# Patient Record
Sex: FEMALE | Race: WHITE | NOT HISPANIC OR LATINO | Employment: OTHER | URBAN - METROPOLITAN AREA
[De-identification: names, ages, dates, MRNs, and addresses within clinical notes are randomized per-mention and may not be internally consistent; named-entity substitution may affect disease eponyms.]

---

## 2019-02-06 ENCOUNTER — TELEPHONE (OUTPATIENT)
Dept: CARDIOLOGY | Facility: CLINIC | Age: 69
End: 2019-02-06

## 2019-02-06 NOTE — TELEPHONE ENCOUNTER
NP OV -- 3/1/19 -- Zully Fernandes    Pt was last seen 1/2016 for ABN EKG, palpitations, CP.    I called for reason for visit- She states she has had bronchitis x 3mos- Has been on prednisone 5 times and 3 different antibotics. Had 2 CXR- clear.    She had seen Dr. Escobar ([facility]) @ [place]- had Ct Chest- was told it showed CAD.    Family History-    Mother- HTN; DM  Father- HTN  Brother- Hyperlipidemia- on statins  Sister- Hyperlipidemia- on statins.    She states she does not want to be on statins.    Current PCP is Jennifer (2019)    Previous PCP is Thiago    I faxed Jennifer Escobar and Thiago for records.

## 2019-03-01 ENCOUNTER — TELEPHONE (OUTPATIENT)
Dept: CARDIOLOGY | Facility: CLINIC | Age: 69
End: 2019-03-01

## 2019-03-01 ENCOUNTER — OFFICE VISIT (OUTPATIENT)
Dept: CARDIOLOGY | Facility: CLINIC | Age: 69
End: 2019-03-01
Payer: COMMERCIAL

## 2019-03-01 VITALS
RESPIRATION RATE: 16 BRPM | SYSTOLIC BLOOD PRESSURE: 122 MMHG | HEART RATE: 74 BPM | WEIGHT: 137.1 LBS | OXYGEN SATURATION: 97 % | HEIGHT: 63 IN | BODY MASS INDEX: 24.29 KG/M2 | DIASTOLIC BLOOD PRESSURE: 66 MMHG

## 2019-03-01 DIAGNOSIS — R00.2 PALPITATIONS: ICD-10-CM

## 2019-03-01 DIAGNOSIS — R06.02 SHORTNESS OF BREATH: ICD-10-CM

## 2019-03-01 DIAGNOSIS — R07.89 SENSATION OF CHEST TIGHTNESS: ICD-10-CM

## 2019-03-01 DIAGNOSIS — I25.10 CORONARY ARTERY CALCIFICATION SEEN ON CAT SCAN: ICD-10-CM

## 2019-03-01 DIAGNOSIS — Z00.00 ROUTINE ADULT HEALTH MAINTENANCE: Primary | ICD-10-CM

## 2019-03-01 PROCEDURE — 99204 OFFICE O/P NEW MOD 45 MIN: CPT | Performed by: INTERNAL MEDICINE

## 2019-03-01 PROCEDURE — 93000 ELECTROCARDIOGRAM COMPLETE: CPT | Performed by: INTERNAL MEDICINE

## 2019-03-01 RX ORDER — PHENOBARBITAL 60 MG/1
60 TABLET ORAL DAILY
Refills: 0 | COMMUNITY
Start: 2019-02-08 | End: 2023-10-31

## 2019-03-01 RX ORDER — PANTOPRAZOLE SODIUM 40 MG/1
40 TABLET, DELAYED RELEASE ORAL DAILY
Refills: 0 | COMMUNITY
Start: 2019-02-18 | End: 2024-11-01

## 2019-03-01 RX ORDER — FAMOTIDINE 40 MG/1
40 TABLET, FILM COATED ORAL NIGHTLY
Refills: 0 | COMMUNITY
Start: 2019-02-18 | End: 2024-11-01

## 2019-03-01 ASSESSMENT — ENCOUNTER SYMPTOMS
NEAR-SYNCOPE: 0
DYSPNEA ON EXERTION: 1
NERVOUS/ANXIOUS: 1
COUGH: 1
WEIGHT GAIN: 1
SYNCOPE: 0
PALPITATIONS: 0
PND: 0
SPUTUM PRODUCTION: 1
TREMORS: 1
SLEEP DISTURBANCES DUE TO BREATHING: 1
ORTHOPNEA: 0
SHORTNESS OF BREATH: 1
WHEEZING: 1

## 2019-03-01 ASSESSMENT — PAIN SCALES - GENERAL: PAINLEVEL: 0-NO PAIN

## 2019-03-01 NOTE — PROGRESS NOTES
"Cardiology Consult/New Patient  Patient ID: Trudi Patel is a 68 y.o. female. 1950  HPI   The patient presents to the office today for evaluation regarding an abnormal chest CT.  Please allow me to review her history.  She is a 68-year-old white female whose past medical history is significant for reflux, irritable bowel and seizure disorder, who was seen by me several years ago for intermittent palpitations.  At that time, she had a stress echo which was negative for ischemia.  She did not have any personal family history and although both of her parents had heart disease, her mother lived to age 92 and her father also  at 92 after having a myocardial infarction at age 87.    She is in the office today because she had a chest CT done on 19 which showed mild biapical pleural changes, slightly more prominent on the right.  A 3 mm left apical parenchymal nodule and a few small mediastinal lymph nodes.  There was a \"small focus of coronary artery calcification\" amount not quantified.  She wanted to follow-up regarding this.    With her CAT scan was performed because she has been sick for approximately 2 months.  She was in her usual state of health when she traveled to visit family and was around small children.  She was also visited by her daughter who with a sinus infection and shortly after that she developed a sinus infection.  I reviewed office notes from various visits dating back to early January.  Her sinus infection quickly turned into a bronchitis and she says that she coughed so violently that she broke several ribs.  There is evidence of rib fractures on her CAT scan.  She was treated with antibiotics but because of the bilateral persistent coughing, she was placed on a Medrol Dosepak.  She states that this actually made her feel better, but it was short-lived.  As soon as she was off, her coughing started again.  She is using a Neti pot and says that she feels better \"with steam\".  She was " "also seen by ENT who felt she had \"silent reflux\" put her on an extremely restrictive diet with no dairy, no chocolate, no caffeine, etc. and she is very unhappy about this.  She states that she does not think it is her reflux and she is very unhappy about needing to follow the diet.  She has been multiple times if I thought this was necessary.    She says that she continues to cough, especially at nighttime and will cough for several hours continuously.  Phenergan with codeine helped.  I did not see any inhalers on her medication list and she says that she was given albuterol after her visit to urgent care, which she uses as needed and was previously prescribed \"a green inhaler\" and Symbicort.  She has not been using these because she \"does not want to become dependent upon them\".  She has been told she has asthma but she is not convinced this is true.  All through the office visit she was coughing and expectorating sputum.       Past Medical History:   Diagnosis Date   • Asthma    • Benign essential tremor    • Bronchitis     Persistent bronchitis following sinusitis   • GERD (gastroesophageal reflux disease)    • Irritable bowel syndrome    • Nephrolithiasis    • Osteoporosis     Patient has declined treatment for this   • Seizures (CMS/HCC) (HCC)    • Sinusitis      Past Surgical History:   Procedure Laterality Date   • APPENDECTOMY     • CHOLECYSTECTOMY     • ROTATOR CUFF REPAIR     • SEPTOPLASTY     • SHOULDER ARTHROSCOPY     • TUBAL LIGATION     • WRIST SURGERY Left      Social History   Substance Use Topics   • Smoking status: Never Smoker   • Smokeless tobacco: Never Used   • Alcohol use Yes      Comment: Social Drinker     Family History   Problem Relation Age of Onset   • Heart attack Mother    • Heart failure Mother    • Fainting Mother    • Diabetes Mother    • Hypertension Mother    • Hypertension Father    • Heart disease Father    • Hypertension Sister    • Hyperlipidemia Sister    • Diabetes Sister  " "  • Hyperlipidemia Brother         Allergies: Patient has no known allergies.    Current Medications:   Current Outpatient Prescriptions:   •  famotidine (PEPCID) 40 mg tablet, Take 40 mg by mouth nightly., Disp: , Rfl: 0  •  pantoprazole (PROTONIX) 40 mg EC tablet, 40 mg daily., Disp: , Rfl: 0  •  PHENobarbital (LUMINAL) 60 mg tablet, 60 mg daily., Disp: , Rfl: 0     Review of Systems   Constitution: Positive for weight gain.        Patient is unhappy about her recent weight gain-states she has been eating more and unable to exercise.   HENT: Positive for congestion.    Cardiovascular: Positive for dyspnea on exertion. Negative for chest pain, leg swelling, near-syncope, orthopnea, palpitations, paroxysmal nocturnal dyspnea and syncope.   Respiratory: Positive for cough, shortness of breath, sleep disturbances due to breathing, sputum production and wheezing.         Coughing almost constantly in the office.  The symptoms did not improve with sleeping in an elevated position.   Gastrointestinal:        Long history of reflux.   Neurological: Positive for tremors.   Psychiatric/Behavioral: The patient is nervous/anxious.         Very anxious about her persistent cough     Vitals:    03/01/19 1006 03/01/19 1035   BP: 122/70 122/66   BP Location: Left upper arm Right upper arm   Patient Position: Sitting    Pulse: 74    Resp: 16    SpO2: 97%    Weight: 62.2 kg (137 lb 1.6 oz)    Height: 1.6 m (5' 3\")      Physical Exam   Constitutional: She is oriented to person, place, and time. She appears well-developed and well-nourished. She is cooperative.   HENT:   Head: Normocephalic and atraumatic.   Mouth/Throat: Oropharynx is clear and moist and mucous membranes are normal.   Eyes: Conjunctivae and EOM are normal. No scleral icterus.   Neck: Trachea normal and normal range of motion. Neck supple. No hepatojugular reflux and no JVD present. Carotid bruit is not present. No thyromegaly present.   Cardiovascular: Normal rate, " "regular rhythm, S1 normal, S2 normal, normal heart sounds and intact distal pulses.   No extrasystoles are present. Exam reveals no gallop, no S3, no S4 and no friction rub.    Pulses:       Carotid pulses are 2+ on the right side, and 2+ on the left side.       Posterior tibial pulses are 2+ on the right side, and 2+ on the left side.   Pulmonary/Chest: Effort normal. She has wheezes in the right middle field and the right lower field. She has no rhonchi. She has no rales.   Almost incessant coughing.   Abdominal: Soft. Bowel sounds are normal. She exhibits no abdominal bruit. There is no tenderness.   Musculoskeletal: Normal range of motion. She exhibits no tenderness or deformity.   Neurological: She is alert and oriented to person, place, and time. No cranial nerve deficit.   Skin: Skin is warm, dry and intact. No rash noted. No cyanosis. Nails show no clubbing.   Psychiatric: Her speech is normal and behavior is normal. Her mood appears anxious.        Labs     Imaging:  Exercise stress echocardiogram 1/22/16: Normal LV size and function.  Normal wall motion.  RV normal size and function.  No pericardial effusion.  Trileaflet aortic valve with minimal sclerosis.  Minimal degenerative mitral valve disease.  Trace AI.  Trace TR.  Trace MR.  Trace PI.  Impaired relaxation.  Immediately following exercise all segments become hyperdynamic.    EKG:     Assessment/Plan   1.  Shortness of breath  Shortness of breath is secondary to persistent bronchitis with bronchospasm and cough.  She has no evidence of volume overload.  The majority of her questions were related to pulmonary issues and she wants to know if there is \"anything else she can do\".  She has had several suggestions from her pulmonologist.  I told her the first issue is that she is not being compliant with the prescribed inhalers and needs to start these as recommended.  The issue of \"becoming dependent on them\" is not an appropriate strategy right now.  " "She wants to know if she may need more prednisone and I told her this is possible.  I told her she should direct her questions to her pulmonologist for specific instructions.    2. Coronary artery calcification seen on CAT scan  Certainly, she needs to have an evaluation.  Given her current respiratory status, I do not think she will be able to have a stress echo for 6-8 weeks.  She has a very tiny amount of calcium that was not completely quantified.  She should eventually have a CT calcium score for formal quantification.  I told her that she may need to consider using aspirin, but I am not starting this right now considering her current asthma issues.  She is also about to go for CT of the sinuses.  She wanted to know if she needs statin and I told her this is a possibility, but I do not have any recent lipid profiles or LFTs.  She does not appear particularly enthusiastic about this.  - Echocardiogram stress test; Future    3. Palpitations  Her palpitations are not an issue right now.    4. Sensation of chest tightness  Prior to the bronchitis she was not experiencing any chest discomfort.  She will be returning for an exercise stress echocardiogram.    5.  Reflux  The patient is very unhappy about the reflux diet she was given.  She said \"I have a craving for chocolate and have some in the car\"-I told her this may worsen her reflux and cough.  Told her she should avoid dairy.  She wants to use caffeine, which I do not objective from a cardiac perspective, but she has been instructed not to do this by ENT.  I told her whether or not she follows these instructions as her decision.  Continue PPI.    Return in about 4 months (around 7/1/2019) for next scheduled follow-up, earlier with any change in symptoms, after appropriate studies have been completed.  Thank you for allowing me to participate in the care of this patient.  I hope this information is helpful.  Please do not hesitate to contact me with any " questions or concerns.      Shabana Willingham MD  3/1/2019

## 2019-03-01 NOTE — LETTER
"2019     Jenny Rodriguez MD  [address]  [facility] [address]    Patient: Trudi Patel   YOB: 1950   Date of Visit: 3/1/2019       Dear Dr. Rodriguez:    Thank you for referring Trudi Patel to me for evaluation. Below are my notes for this consultation.    If you have questions, please do not hesitate to call me. I look forward to following your patient along with you.         Sincerely,        Shabana Willingham MD        CC: MD Yaya Schaefer Erin, MD  3/1/2019  1:58 PM  Signed  Cardiology Consult/New Patient  Patient ID: Trudi Patel is a 68 y.o. female. 1950  HPI   The patient presents to the office today for evaluation regarding an abnormal chest CT.  Please allow me to review her history.  She is a 68-year-old white female whose past medical history is significant for reflux, irritable bowel and seizure disorder, who was seen by me several years ago for intermittent palpitations.  At that time, she had a stress echo which was negative for ischemia.  She did not have any personal family history and although both of her parents had heart disease, her mother lived to age 92 and her father also  at 92 after having a myocardial infarction at age 87.    She is in the office today because she had a chest CT done on 19 which showed mild biapical pleural changes, slightly more prominent on the right.  A 3 mm left apical parenchymal nodule and a few small mediastinal lymph nodes.  There was a \"small focus of coronary artery calcification\" amount not quantified.  She wanted to follow-up regarding this.    With her CAT scan was performed because she has been sick for approximately 2 months.  She was in her usual state of health when she traveled to visit family and was around small children.  She was also visited by her daughter who with a sinus infection and shortly after that she developed a sinus infection.  I reviewed office notes " "from various visits dating back to early January.  Her sinus infection quickly turned into a bronchitis and she says that she coughed so violently that she broke several ribs.  There is evidence of rib fractures on her CAT scan.  She was treated with antibiotics but because of the bilateral persistent coughing, she was placed on a Medrol Dosepak.  She states that this actually made her feel better, but it was short-lived.  As soon as she was off, her coughing started again.  She is using a Neti pot and says that she feels better \"with steam\".  She was also seen by ENT who felt she had \"silent reflux\" put her on an extremely restrictive diet with no dairy, no chocolate, no caffeine, etc. and she is very unhappy about this.  She states that she does not think it is her reflux and she is very unhappy about needing to follow the diet.  She has been multiple times if I thought this was necessary.    She says that she continues to cough, especially at nighttime and will cough for several hours continuously.  Phenergan with codeine helped.  I did not see any inhalers on her medication list and she says that she was given albuterol after her visit to urgent care, which she uses as needed and was previously prescribed \"a green inhaler\" and Symbicort.  She has not been using these because she \"does not want to become dependent upon them\".  She has been told she has asthma but she is not convinced this is true.  All through the office visit she was coughing and expectorating sputum.       Past Medical History:   Diagnosis Date   • Asthma    • Benign essential tremor    • Bronchitis     Persistent bronchitis following sinusitis   • GERD (gastroesophageal reflux disease)    • Irritable bowel syndrome    • Nephrolithiasis    • Osteoporosis     Patient has declined treatment for this   • Seizures (CMS/HCC) ([facility])    • Sinusitis      Past Surgical History:   Procedure Laterality Date   • APPENDECTOMY     • CHOLECYSTECTOMY     • " ROTATOR CUFF REPAIR     • SEPTOPLASTY     • SHOULDER ARTHROSCOPY     • TUBAL LIGATION     • WRIST SURGERY Left      Social History   Substance Use Topics   • Smoking status: Never Smoker   • Smokeless tobacco: Never Used   • Alcohol use Yes      Comment: Social Drinker     Family History   Problem Relation Age of Onset   • Heart attack Mother    • Heart failure Mother    • Fainting Mother    • Diabetes Mother    • Hypertension Mother    • Hypertension Father    • Heart disease Father    • Hypertension Sister    • Hyperlipidemia Sister    • Diabetes Sister    • Hyperlipidemia Brother         Allergies: Patient has no known allergies.    Current Medications:   Current Outpatient Prescriptions:   •  famotidine (PEPCID) 40 mg tablet, Take 40 mg by mouth nightly., Disp: , Rfl: 0  •  pantoprazole (PROTONIX) 40 mg EC tablet, 40 mg daily., Disp: , Rfl: 0  •  PHENobarbital (LUMINAL) 60 mg tablet, 60 mg daily., Disp: , Rfl: 0     Review of Systems   Constitution: Positive for weight gain.        Patient is unhappy about her recent weight gain-states she has been eating more and unable to exercise.   HENT: Positive for congestion.    Cardiovascular: Positive for dyspnea on exertion. Negative for chest pain, leg swelling, near-syncope, orthopnea, palpitations, paroxysmal nocturnal dyspnea and syncope.   Respiratory: Positive for cough, shortness of breath, sleep disturbances due to breathing, sputum production and wheezing.         Coughing almost constantly in the office.  The symptoms did not improve with sleeping in an elevated position.   Gastrointestinal:        Long history of reflux.   Neurological: Positive for tremors.   Psychiatric/Behavioral: The patient is nervous/anxious.         Very anxious about her persistent cough     Vitals:    03/01/19 1006 03/01/19 1035   BP: 122/70 122/66   BP Location: Left upper arm Right upper arm   Patient Position: Sitting    Pulse: 74    Resp: 16    SpO2: 97%    Weight: 62.2 kg (137  "lb 1.6 oz)    Height: 1.6 m (5' 3\")      Physical Exam   Constitutional: She is oriented to person, place, and time. She appears well-developed and well-nourished. She is cooperative.   HENT:   Head: Normocephalic and atraumatic.   Mouth/Throat: Oropharynx is clear and moist and mucous membranes are normal.   Eyes: Conjunctivae and EOM are normal. No scleral icterus.   Neck: Trachea normal and normal range of motion. Neck supple. No hepatojugular reflux and no JVD present. Carotid bruit is not present. No thyromegaly present.   Cardiovascular: Normal rate, regular rhythm, S1 normal, S2 normal, normal heart sounds and intact distal pulses.   No extrasystoles are present. Exam reveals no gallop, no S3, no S4 and no friction rub.    Pulses:       Carotid pulses are 2+ on the right side, and 2+ on the left side.       Posterior tibial pulses are 2+ on the right side, and 2+ on the left side.   Pulmonary/Chest: Effort normal. She has wheezes in the right middle field and the right lower field. She has no rhonchi. She has no rales.   Almost incessant coughing.   Abdominal: Soft. Bowel sounds are normal. She exhibits no abdominal bruit. There is no tenderness.   Musculoskeletal: Normal range of motion. She exhibits no tenderness or deformity.   Neurological: She is alert and oriented to person, place, and time. No cranial nerve deficit.   Skin: Skin is warm, dry and intact. No rash noted. No cyanosis. Nails show no clubbing.   Psychiatric: Her speech is normal and behavior is normal. Her mood appears anxious.        Labs     Imaging:  Exercise stress echocardiogram 1/22/16: Normal LV size and function.  Normal wall motion.  RV normal size and function.  No pericardial effusion.  Trileaflet aortic valve with minimal sclerosis.  Minimal degenerative mitral valve disease.  Trace AI.  Trace TR.  Trace MR.  Trace PI.  Impaired relaxation.  Immediately following exercise all segments become hyperdynamic.    EKG:   " "  Assessment/Plan   1.  Shortness of breath  Shortness of breath is secondary to persistent bronchitis with bronchospasm and cough.  She has no evidence of volume overload.  The majority of her questions were related to pulmonary issues and she wants to know if there is \"anything else she can do\".  She has had several suggestions from her pulmonologist.  I told her the first issue is that she is not being compliant with the prescribed inhalers and needs to start these as recommended.  The issue of \"becoming dependent on them\" is not an appropriate strategy right now.  She wants to know if she may need more prednisone and I told her this is possible.  I told her she should direct her questions to her pulmonologist for specific instructions.    2. Coronary artery calcification seen on CAT scan  Certainly, she needs to have an evaluation.  Given her current respiratory status, I do not think she will be able to have a stress echo for 6-8 weeks.  She has a very tiny amount of calcium that was not completely quantified.  She should eventually have a CT calcium score for formal quantification.  I told her that she may need to consider using aspirin, but I am not starting this right now considering her current asthma issues.  She is also about to go for CT of the sinuses.  She wanted to know if she needs statin and I told her this is a possibility, but I do not have any recent lipid profiles or LFTs.  She does not appear particularly enthusiastic about this.  - Echocardiogram stress test; Future    3. Palpitations  Her palpitations are not an issue right now.    4. Sensation of chest tightness  Prior to the bronchitis she was not experiencing any chest discomfort.  She will be returning for an exercise stress echocardiogram.    5.  Reflux  The patient is very unhappy about the reflux diet she was given.  She said \"I have a craving for chocolate and have some in the car\"-I told her this may worsen her reflux and cough.  " Told her she should avoid dairy.  She wants to use caffeine, which I do not objective from a cardiac perspective, but she has been instructed not to do this by ENT.  I told her whether or not she follows these instructions as her decision.  Continue PPI.    Return in about 4 months (around 7/1/2019) for next scheduled follow-up, earlier with any change in symptoms, after appropriate studies have been completed.  Thank you for allowing me to participate in the care of this patient.  I hope this information is helpful.  Please do not hesitate to contact me with any questions or concerns.      Shabana Willingham MD  3/1/2019

## 2019-04-03 ENCOUNTER — TELEPHONE (OUTPATIENT)
Dept: CARDIOLOGY | Facility: CLINIC | Age: 69
End: 2019-04-03

## 2019-04-03 NOTE — TELEPHONE ENCOUNTER
LM[facility] to [facility] pt[facility]19 [facility]

## 2019-06-29 NOTE — TELEPHONE ENCOUNTER
Pt rescheduled Stress Echo to 8/16/19.    The approved precert expires 7/27/19.    Please re-precert.    Stress echo- [facility]- 8/16/19

## 2019-08-16 ENCOUNTER — TELEPHONE (OUTPATIENT)
Dept: SCHEDULING | Facility: CLINIC | Age: 69
End: 2019-08-16

## 2019-08-16 ENCOUNTER — HOSPITAL ENCOUNTER (OUTPATIENT)
Dept: CARDIOLOGY | Facility: CLINIC | Age: 69
Discharge: HOME | End: 2019-08-16
Payer: COMMERCIAL

## 2019-08-16 VITALS — BODY MASS INDEX: 24.27 KG/M2 | WEIGHT: 137 LBS | HEIGHT: 63 IN

## 2019-08-16 DIAGNOSIS — R06.02 SHORTNESS OF BREATH: ICD-10-CM

## 2019-08-16 DIAGNOSIS — I25.10 CORONARY ARTERY CALCIFICATION SEEN ON CAT SCAN: ICD-10-CM

## 2019-08-16 PROCEDURE — 93350 STRESS TTE ONLY: CPT | Performed by: INTERNAL MEDICINE

## 2019-08-16 NOTE — TELEPHONE ENCOUNTER
Pt requesting to have today's Stress Echo Test results sent to Pulmonogist, Dr. Mitchell Haro at [facility]: Medical Records as soon as they become available.     [phone]  [phone]    Pt is also asking that results be sent to PCP, Dr. Villalpando     [phone]  [phone]

## 2019-08-20 LAB
AORTIC ROOT ANNULUS: 3.4 CM
ASCENDING AORTA: 3.2 CM
AV PEAK GRADIENT: 7 MMHG
AV PEAK VELOCITY-S: 1.32 M/S
AV VALVE AREA: 2.28 CM2
BSA FOR ECHO PROCEDURE: 1.66 M2
E WAVE DECELERATION TIME: 215 MS
E/A RATIO: 0.9
E/E' RATIO: 5.7
E/LAT E' RATIO: 5.7
EDV (BP): 64.2 CM3
EF (A4C): 68 %
EF A2C: 76.2 %
EJECTION FRACTION: 72.6 %
ESV (BP): 17.6 CM3
LA ESV (BP): 37.8 CM3
LA ESV INDEX (A2C): 25.3 CM3/M2
LA ESV INDEX (BP): 22.77 CM3/M2
LA/AORTA RATIO: 0.85
LAAS-AP2: 16.2 CM2
LAAS-AP4: 13.4 CM2
LAD 2D: 2.9 CM
LALD A4C: 4.5 CM
LALD A4C: 5.02 CM
LAV-S: 42 CM3
LEFT ATRIUM VOLUME INDEX: 22.89 CM3/M2
LEFT ATRIUM VOLUME: 38 CM3
LEFT VENTRICLE DIASTOLIC VOLUME INDEX: 41.75 CM3/M2
LEFT VENTRICLE DIASTOLIC VOLUME: 69.3 CM3
LEFT VENTRICLE SYSTOLIC VOLUME INDEX: 13.37 CM3/M2
LEFT VENTRICLE SYSTOLIC VOLUME: 22.2 CM3
LV DIASTOLIC VOLUME: 59.4 CM3
LV ESV (APICAL 2 CHAMBER): 14.2 CM3
LVAD-AP2: 21.8 CM2
LVAD-AP4: 24.4 CM2
LVAS-AP2: 9.08 CM2
LVAS-AP4: 11.5 CM2
LVEDVI(A2C): 35.78 CM3/M2
LVEDVI(BP): 38.67 CM3/M2
LVESVI(A2C): 8.55 CM3/M2
LVESVI(BP): 10.6 CM3/M2
LVLD-AP2: 6.98 CM
LVLD-AP4: 7.15 CM
LVLS-AP2: 5.39 CM
LVLS-AP4: 5.52 CM
LVOT 2D: 1.9 CM
LVOT A: 2.84 CM2
LVOT PEAK VELOCITY: 1.06 M/S
MV E'TISSUE VEL-LAT: 0.11 M/S
MV E'TISSUE VEL-MED: 0.11 M/S
MV PEAK A VEL: 0.76 M/S
MV PEAK E VEL: 0.65 M/S
PV PEAK GRADIENT: 4 MMHG
PV PV: 0.97 M/S
RVOT VMAX: 0.78 M/S
STRESS ANGINA INDEX: 0
STRESS BASELINE BP: NORMAL MMHG
STRESS BASELINE HR: 66 BPM
STRESS O2 SAT REST: 97 %
STRESS PERCENT HR: 88 %
STRESS POST ESTIMATED WORKLOAD: 7 METS
STRESS POST EXERCISE DUR MIN: 6 MIN
STRESS POST EXERCISE DUR SEC: 22 SEC
STRESS POST PEAK BP: NORMAL MMHG
STRESS POST PEAK HR: 134 BPM
STRESS TARGET HR: 130 BPM
TR MAX PG: 16 MMHG
TRICUSPID VALVE PEAK REGURGITATION VELOCITY: 2 M/S
TV REST PULMONARY ARTERY PRESSURE: 21 MMHG

## 2019-08-30 ENCOUNTER — OFFICE VISIT (OUTPATIENT)
Dept: CARDIOLOGY | Facility: CLINIC | Age: 69
End: 2019-08-30
Payer: COMMERCIAL

## 2019-08-30 VITALS
OXYGEN SATURATION: 98 % | SYSTOLIC BLOOD PRESSURE: 118 MMHG | BODY MASS INDEX: 23.57 KG/M2 | WEIGHT: 133 LBS | DIASTOLIC BLOOD PRESSURE: 84 MMHG | HEIGHT: 63 IN | HEART RATE: 65 BPM

## 2019-08-30 DIAGNOSIS — R06.02 SHORTNESS OF BREATH: Primary | ICD-10-CM

## 2019-08-30 DIAGNOSIS — R00.2 PALPITATION: ICD-10-CM

## 2019-08-30 DIAGNOSIS — I25.10 CORONARY ARTERY CALCIFICATION SEEN ON CAT SCAN: ICD-10-CM

## 2019-08-30 PROCEDURE — 99214 OFFICE O/P EST MOD 30 MIN: CPT | Performed by: INTERNAL MEDICINE

## 2019-08-30 PROCEDURE — 93000 ELECTROCARDIOGRAM COMPLETE: CPT | Performed by: INTERNAL MEDICINE

## 2019-08-30 RX ORDER — FLUTICASONE PROPIONATE 50 MCG
1 SPRAY, SUSPENSION (ML) NASAL DAILY
COMMUNITY
End: 2023-10-31

## 2019-08-30 RX ORDER — PHENOBARBITAL, HYOSCYAMINE SULFATE, ATROPINE SULFATE AND SCOPOLAMINE HYDROBROMIDE .0194; .1037; 16.2; .0065 MG/1; MG/1; MG/1; MG/1
1 TABLET ORAL AS NEEDED
COMMUNITY
End: 2023-10-31

## 2019-08-30 RX ORDER — TALC
3 POWDER (GRAM) TOPICAL NIGHTLY
COMMUNITY
End: 2024-11-01

## 2019-08-30 RX ORDER — MONTELUKAST SODIUM 10 MG/1
10 TABLET ORAL NIGHTLY
COMMUNITY
End: 2023-10-31

## 2019-08-30 RX ORDER — CETIRIZINE HYDROCHLORIDE 5 MG/1
5 TABLET ORAL DAILY
COMMUNITY
End: 2023-10-31

## 2019-08-30 RX ORDER — POLYETHYLENE GLYCOL 3350 17 G/17G
17 POWDER, FOR SOLUTION ORAL DAILY
COMMUNITY
End: 2023-10-31

## 2019-08-30 NOTE — LETTER
"September 4, 2019     Rashmi Olivares MD  [address]  [facility] [address]    Patient: Trudi Patel  YOB: 1950  Date of Visit: 8/30/2019      Dear Dr. Olivares:    Thank you for referring Trudi Patel to me for evaluation. Below are my notes for this consultation.    If you have questions, please do not hesitate to call me. I look forward to following your patient along with you.         Sincerely,        Shabana Willingham MD        CC: MD Yaya Schaefer Erin, MD  9/4/2019  9:30 AM  Signed       Cardiology Office Visit     Patient ID: Trudi Patel 69 y.o. female 1950    History Present Illness     Trudi Patel returns to the office today for follow-up regarding her dyspnea and persistent cough.  Please allow me to review her history.  The patient is a 69-year-old white female who was initially evaluated in March of this year regarding an abnormal chest CT.  She had no personal history of coronary artery disease, myocardial infarction or CHF.  Several years ago she had a stress echo for intermittent palpitations which was negative for ischemia.  Because she had developed incessant cough she had a chest CT done in late January demonstrating mild biapical pleural changes, slightly more prominent on the right.  There was a 3 mm left apical parenchymal nodule and a few small mediastinal lymph nodes.  The report also described a \"small focus of coronary artery calcification\" the amount was not quantified.  She was recommended to follow-up regarding this.      Today in the office she says she is feeling much better.  It took many months, but her cough has finally improved.  She is sleeping better, but is still not completely back at her baseline.  She is becoming more active again.    On 8/16/2019, the patient had an exercise stress echocardiogram.  She had an average exercise capacity with no exercise-induced arrhythmia.  At baseline the LV was " normal in size and function with normal wall motion.  LVEF 65-70%.  RV was normal in size and function.  The left and right atria were normal.  Trace TR.  PASP approximately 25 mmHg.  Trace MR.  Trileaflet aortic valve with no significant stenosis or insufficiency.  Immediately following exercise, all segments became hyperdynamic.  We reviewed these results today.    Past History     Past Medical History:   Diagnosis Date   • Asthma    • Benign essential tremor    • Bronchitis     Persistent bronchitis following sinusitis   • GERD (gastroesophageal reflux disease)    • Irritable bowel syndrome    • Nephrolithiasis    • Osteoporosis     Patient has declined treatment for this   • Seizures (CMS/HCC) (HCC)    • Sinusitis      Past Surgical History:   Procedure Laterality Date   • APPENDECTOMY     • CHOLECYSTECTOMY     • ROTATOR CUFF REPAIR     • SEPTOPLASTY     • SHOULDER ARTHROSCOPY     • TUBAL LIGATION     • WRIST SURGERY Left      Social History:  reports that she has never smoked. She has never used smokeless tobacco. She reports that she drinks alcohol. She reports that she does not use drugs.    Family History: family history includes Diabetes in her mother and sister; Fainting in her mother; Heart attack in her mother; Heart disease in her father; Heart failure in her mother; Hyperlipidemia in her brother and sister; Hypertension in her father, mother, and sister.  Allergies/Medications   Allergies:Patient has no known allergies.    Medications:  Outpatient Encounter Prescriptions as of 8/30/2019:   •  cetirizine (ZyrTEC) 5 mg tablet, Take 5 mg by mouth daily.  •  famotidine (PEPCID) 40 mg tablet, Take 40 mg by mouth nightly.  •  fluticasone propionate (FLONASE) 50 mcg/actuation nasal spray, Administer 1 spray into each nostril daily.  •  melatonin 3 mg tablet, Take 3 mg by mouth nightly.  •  montelukast (SINGULAIR) 10 mg tablet, Take 10 mg by mouth nightly.  •  pantoprazole (PROTONIX) 40 mg EC tablet, 40 mg  "daily.  •  PHENobarbital (LUMINAL) 60 mg tablet, 60 mg daily.  •  PHENobarbital-hyoscyaamine-atropine-scopoloamine (DONNATAL) 16.2-0.1037 -0.0194 mg tablet, Take 1 tablet by mouth as needed for heartburn.  •  polyethylene glycol (MIRALAX) 17 gram packet, Take 17 g by mouth daily.  •  rifAXIMin (XIFAXAN) 550 mg tablet, Take 550 mg by mouth 3 (three) times a day.  •  wheat dextrin (BENEFIBER CLEAR SF, DEXTRIN, ORAL), Take 1 tablet by mouth.  ROS/Physical Exam   ROS  Pertinent items are noted in HPI.  Cough has improved significantly.  Dyspnea has improved.  Increased stress regarding her 's medical issues.  Comprehensive (12+ point) ROS otherwise negative.  Vitals:    08/30/19 0909   BP: 118/84 millimeters mercury, repeat blood pressure 124/78 mmHg   BP Location: Left upper arm   Patient Position: Sitting   Pulse: 65   SpO2: 98%   Weight: 60.3 kg (133 lb)   Height: 1.6 m (5' 3\")     BP Readings from Last 3 Encounters:   08/30/19 118/84   03/01/19 122/66     Wt Readings from Last 3 Encounters:   08/30/19 60.3 kg (133 lb)   08/16/19 62.1 kg (137 lb)   03/01/19 62.2 kg (137 lb 1.6 oz)     Physical Exam   Constitutional: She is oriented to person, place, and time. She appears well-developed and well-nourished. She is cooperative.   HENT:   Head: Normocephalic and atraumatic.   Mouth/Throat: Oropharynx is clear and moist and mucous membranes are normal.   Eyes: Conjunctivae and EOM are normal. No scleral icterus.   Neck: Trachea normal and normal range of motion. Neck supple. No hepatojugular reflux and no JVD present. Carotid bruit is not present. No thyromegaly present.   Cardiovascular: Normal rate, regular rhythm, S1 normal, S2 normal, normal heart sounds and intact distal pulses.   No extrasystoles are present. Exam reveals no gallop, no S3, no S4 and no friction rub.    Pulses:       Carotid pulses are 2+ on the right side, and 2+ on the left side.       Posterior tibial pulses are 2+ on the right side, and 2+ " on the left side.   Pulmonary/Chest: Effort normal. She has no wheezes. She has no rhonchi. She has no rales.   Prolonged expiratory phase.  No active wheezing.   Abdominal: Soft. Bowel sounds are normal. She exhibits no abdominal bruit. There is no tenderness.   Musculoskeletal: Normal range of motion. She exhibits no tenderness or deformity.   Neurological: She is alert and oriented to person, place, and time. No cranial nerve deficit.   Skin: Skin is warm, dry and intact. No rash noted. No cyanosis. Nails show no clubbing.   Psychiatric: Her speech is normal and behavior is normal. Her mood appears anxious.     Labs/Imaging/Procedures   Labs  No recent lab work available    Imaging  Exercise stress echo 8/16/2019: Average exercise capacity with no exercise-induced arrhythmia.  Exercise EKG negative for ischemia.  At baseline, the LV was normal in size and function with normal wall motion.  LVEF 65-70%.  RV was normal in size and function.  The left and right atria were normal.  Trace TR.  PASP approximately 25 mmHg. Trace MR.  Trileaflet aortic valve with no significant stenosis or insufficiency.  Immediately following exercise, all segments became hyperdynamic.     Exercise stress echocardiogram 1/22/16: Normal LV size and function.  Normal wall motion.  RV normal size and function.  No pericardial effusion.  Trileaflet aortic valve with minimal sclerosis.  Minimal degenerative mitral valve disease.  Trace AI.  Trace TR.  Trace MR.  Trace PI.  Impaired relaxation.  Immediately following exercise all segments become hyperdynamic    EKG  Assessment/Plan     1. Palpitation  Occasional palpitations, but no sustained arrhythmia.  This is not much of an issue right now.  She has normal LV and RV function and does not require any additional treatment.  I would avoid beta-blocker given all of her pulmonary issues.    2. Coronary artery calcification seen on CAT scan  Small focus of coronary calcium, not quantified.  Her  stress echo was negative.  Continue aggressive risk factor modification.  She does not have any fasting lipid profile available, but when should be performed if she has nothing in the last year.    3. Shortness of breath  Her shortness of breath has improved.  The cough is significantly better.  She will continue on her current pulmonary regimen.  She continues on reflux medication as well.  Trudi has no evidence of congestive heart failure.  I am not making any changes in her current medical regimen.  She does wish to see an allergist and is in the process of trying to find someone who accepts her insurance.    Return in about 1 year (around 8/30/2020) for next scheduled follow-up, earlier with any change in symptoms.  The patient has requested that a copy of her letter and stress echo be mailed to her home.  I have reviewed the patient's medical history in detail and updated the computerized patient record.  Thank you for allowing me to participate in the care of this patient.  I hope this information is helpful.    Shabana Willingham MD [facility]  9/4/2019  9:10 AM

## 2019-09-04 ENCOUNTER — TELEPHONE (OUTPATIENT)
Dept: CARDIOLOGY | Facility: CLINIC | Age: 69
End: 2019-09-04

## 2019-09-04 PROBLEM — R06.02 SHORTNESS OF BREATH: Status: ACTIVE | Noted: 2019-09-04

## 2019-09-04 PROBLEM — I25.10 CORONARY ARTERY CALCIFICATION SEEN ON CAT SCAN: Chronic | Status: ACTIVE | Noted: 2019-03-01

## 2019-09-04 NOTE — PROGRESS NOTES
"     Cardiology Office Visit     Patient ID: Trudi Patel 69 y.o. female 1950    History Present Illness     Trudi Patel returns to the office today for follow-up regarding her dyspnea and persistent cough.  Please allow me to review her history.  The patient is a 69-year-old white female who was initially evaluated in March of this year regarding an abnormal chest CT.  She had no personal history of coronary artery disease, myocardial infarction or CHF.  Several years ago she had a stress echo for intermittent palpitations which was negative for ischemia.  Because she had developed incessant cough she had a chest CT done in late January demonstrating mild biapical pleural changes, slightly more prominent on the right.  There was a 3 mm left apical parenchymal nodule and a few small mediastinal lymph nodes.  The report also described a \"small focus of coronary artery calcification\" the amount was not quantified.  She was recommended to follow-up regarding this.      Today in the office she says she is feeling much better.  It took many months, but her cough has finally improved.  She is sleeping better, but is still not completely back at her baseline.  She is becoming more active again.    On 8/16/2019, the patient had an exercise stress echocardiogram.  She had an average exercise capacity with no exercise-induced arrhythmia.  At baseline the LV was normal in size and function with normal wall motion.  LVEF 65-70%.  RV was normal in size and function.  The left and right atria were normal.  Trace TR.  PASP approximately 25 mmHg.  Trace MR.  Trileaflet aortic valve with no significant stenosis or insufficiency.  Immediately following exercise, all segments became hyperdynamic.  We reviewed these results today.    Past History     Past Medical History:   Diagnosis Date   • Asthma    • Benign essential tremor    • Bronchitis     Persistent bronchitis following sinusitis   • GERD (gastroesophageal reflux " disease)    • Irritable bowel syndrome    • Nephrolithiasis    • Osteoporosis     Patient has declined treatment for this   • Seizures (CMS/HCC) (HCC)    • Sinusitis      Past Surgical History:   Procedure Laterality Date   • APPENDECTOMY     • CHOLECYSTECTOMY     • ROTATOR CUFF REPAIR     • SEPTOPLASTY     • SHOULDER ARTHROSCOPY     • TUBAL LIGATION     • WRIST SURGERY Left      Social History:  reports that she has never smoked. She has never used smokeless tobacco. She reports that she drinks alcohol. She reports that she does not use drugs.    Family History: family history includes Diabetes in her mother and sister; Fainting in her mother; Heart attack in her mother; Heart disease in her father; Heart failure in her mother; Hyperlipidemia in her brother and sister; Hypertension in her father, mother, and sister.  Allergies/Medications   Allergies:Patient has no known allergies.    Medications:  Outpatient Encounter Prescriptions as of 8/30/2019:   •  cetirizine (ZyrTEC) 5 mg tablet, Take 5 mg by mouth daily.  •  famotidine (PEPCID) 40 mg tablet, Take 40 mg by mouth nightly.  •  fluticasone propionate (FLONASE) 50 mcg/actuation nasal spray, Administer 1 spray into each nostril daily.  •  melatonin 3 mg tablet, Take 3 mg by mouth nightly.  •  montelukast (SINGULAIR) 10 mg tablet, Take 10 mg by mouth nightly.  •  pantoprazole (PROTONIX) 40 mg EC tablet, 40 mg daily.  •  PHENobarbital (LUMINAL) 60 mg tablet, 60 mg daily.  •  PHENobarbital-hyoscyaamine-atropine-scopoloamine (DONNATAL) 16.2-0.1037 -0.0194 mg tablet, Take 1 tablet by mouth as needed for heartburn.  •  polyethylene glycol (MIRALAX) 17 gram packet, Take 17 g by mouth daily.  •  rifAXIMin (XIFAXAN) 550 mg tablet, Take 550 mg by mouth 3 (three) times a day.  •  wheat dextrin (BENEFIBER CLEAR SF, DEXTRIN, ORAL), Take 1 tablet by mouth.  ROS/Physical Exam   ROS  Pertinent items are noted in HPI.  Cough has improved significantly.  Dyspnea has improved.   "Increased stress regarding her 's medical issues.  Comprehensive (12+ point) ROS otherwise negative.  Vitals:    08/30/19 0909   BP: 118/84 millimeters mercury, repeat blood pressure 124/78 mmHg   BP Location: Left upper arm   Patient Position: Sitting   Pulse: 65   SpO2: 98%   Weight: 60.3 kg (133 lb)   Height: 1.6 m (5' 3\")     BP Readings from Last 3 Encounters:   08/30/19 118/84   03/01/19 122/66     Wt Readings from Last 3 Encounters:   08/30/19 60.3 kg (133 lb)   08/16/19 62.1 kg (137 lb)   03/01/19 62.2 kg (137 lb 1.6 oz)     Physical Exam   Constitutional: She is oriented to person, place, and time. She appears well-developed and well-nourished. She is cooperative.   HENT:   Head: Normocephalic and atraumatic.   Mouth/Throat: Oropharynx is clear and moist and mucous membranes are normal.   Eyes: Conjunctivae and EOM are normal. No scleral icterus.   Neck: Trachea normal and normal range of motion. Neck supple. No hepatojugular reflux and no JVD present. Carotid bruit is not present. No thyromegaly present.   Cardiovascular: Normal rate, regular rhythm, S1 normal, S2 normal, normal heart sounds and intact distal pulses.   No extrasystoles are present. Exam reveals no gallop, no S3, no S4 and no friction rub.    Pulses:       Carotid pulses are 2+ on the right side, and 2+ on the left side.       Posterior tibial pulses are 2+ on the right side, and 2+ on the left side.   Pulmonary/Chest: Effort normal. She has no wheezes. She has no rhonchi. She has no rales.   Prolonged expiratory phase.  No active wheezing.   Abdominal: Soft. Bowel sounds are normal. She exhibits no abdominal bruit. There is no tenderness.   Musculoskeletal: Normal range of motion. She exhibits no tenderness or deformity.   Neurological: She is alert and oriented to person, place, and time. No cranial nerve deficit.   Skin: Skin is warm, dry and intact. No rash noted. No cyanosis. Nails show no clubbing.   Psychiatric: Her speech is " normal and behavior is normal. Her mood appears anxious.     Labs/Imaging/Procedures   Labs  No recent lab work available    Imaging  Exercise stress echo 8/16/2019: Average exercise capacity with no exercise-induced arrhythmia.  Exercise EKG negative for ischemia.  At baseline, the LV was normal in size and function with normal wall motion.  LVEF 65-70%.  RV was normal in size and function.  The left and right atria were normal.  Trace TR.  PASP approximately 25 mmHg. Trace MR.  Trileaflet aortic valve with no significant stenosis or insufficiency.  Immediately following exercise, all segments became hyperdynamic.     Exercise stress echocardiogram 1/22/16: Normal LV size and function.  Normal wall motion.  RV normal size and function.  No pericardial effusion.  Trileaflet aortic valve with minimal sclerosis.  Minimal degenerative mitral valve disease.  Trace AI.  Trace TR.  Trace MR.  Trace PI.  Impaired relaxation.  Immediately following exercise all segments become hyperdynamic    EKG  Assessment/Plan     1. Palpitation  Occasional palpitations, but no sustained arrhythmia.  This is not much of an issue right now.  She has normal LV and RV function and does not require any additional treatment.  I would avoid beta-blocker given all of her pulmonary issues.    2. Coronary artery calcification seen on CAT scan  Small focus of coronary calcium, not quantified.  Her stress echo was negative.  Continue aggressive risk factor modification.  She does not have any fasting lipid profile available, but when should be performed if she has nothing in the last year.    3. Shortness of breath  Her shortness of breath has improved.  The cough is significantly better.  She will continue on her current pulmonary regimen.  She continues on reflux medication as well.  Trudi has no evidence of congestive heart failure.  I am not making any changes in her current medical regimen.  She does wish to see an allergist and is in the  process of trying to find someone who accepts her insurance.    Return in about 1 year (around 8/30/2020) for next scheduled follow-up, earlier with any change in symptoms.  The patient has requested that a copy of her letter and stress echo be mailed to her home.  I have reviewed the patient's medical history in detail and updated the computerized patient record.  Thank you for allowing me to participate in the care of this patient.  I hope this information is helpful.    Shabana Willingham MD [facility]  9/4/2019  9:10 AM

## 2019-09-04 NOTE — TELEPHONE ENCOUNTER
----- Message from Shabana Willingham MD sent at 9/4/2019  9:29 AM EDT -----  Regarding: Stress echo report and last office note   This patient requested that we mail a copy of her recent office visit and stress echo to her home.  Thank you

## 2019-09-11 NOTE — TELEPHONE ENCOUNTER
With reference to the notes below regarding a precert/ prior auth for a stress echo, I spoke to [facility] today attempting to get a retro prior auth. The original request for a modification of the prior auth obtained was not acquired. [facility] will not backdate any prior auths for outpatient testing.    The stress echo study will need to be adjusted off.

## 2023-02-01 LAB — HBA1C MFR BLD HPLC: 5.4 %

## 2023-09-07 RX ORDER — FAMOTIDINE 10 MG
TABLET ORAL
COMMUNITY
Start: 2023-04-10

## 2023-09-07 RX ORDER — MELOXICAM 7.5 MG/1
1 TABLET ORAL
COMMUNITY

## 2023-09-07 RX ORDER — CETIRIZINE HYDROCHLORIDE 5 MG/1
5 TABLET ORAL DAILY
COMMUNITY

## 2023-09-07 RX ORDER — PHENOBARBITAL, HYOSCYAMINE SULFATE, ATROPINE SULFATE, SCOPOLAMINE HYDROBROMIDE 16.2; .1037; .0194; .0065 MG/1; MG/1; MG/1; MG/1
1 TABLET ORAL
COMMUNITY

## 2023-09-07 RX ORDER — OXYMETAZOLINE HYDROCHLORIDE 0.05 G/100ML
2 SPRAY NASAL 2 TIMES DAILY
COMMUNITY
Start: 2023-08-07

## 2023-09-07 RX ORDER — CHOLESTYRAMINE 4 G/9G
POWDER, FOR SUSPENSION ORAL
COMMUNITY

## 2023-09-07 RX ORDER — MONTELUKAST SODIUM 10 MG/1
10 TABLET ORAL DAILY
COMMUNITY
Start: 2023-06-06 | End: 2024-05-31

## 2023-09-07 RX ORDER — FLUTICASONE PROPIONATE 50 MCG
1 SPRAY, SUSPENSION (ML) NASAL DAILY
COMMUNITY
Start: 2023-08-07 | End: 2024-08-06

## 2023-09-07 RX ORDER — PHENOBARBITAL 60 MG/1
60 TABLET ORAL DAILY
COMMUNITY
Start: 2023-06-28 | End: 2023-12-25

## 2023-09-07 RX ORDER — LIDOCAINE AND PRILOCAINE 25; 25 MG/G; MG/G
CREAM TOPICAL
COMMUNITY

## 2023-09-07 RX ORDER — ONDANSETRON 4 MG/1
TABLET, ORALLY DISINTEGRATING ORAL
COMMUNITY

## 2023-09-07 RX ORDER — DICYCLOMINE HYDROCHLORIDE 10 MG/1
CAPSULE ORAL
COMMUNITY

## 2023-09-07 RX ORDER — FEXOFENADINE HCL 60 MG/1
60 TABLET, FILM COATED ORAL DAILY
COMMUNITY
Start: 2023-08-07

## 2023-09-07 RX ORDER — POLYETHYLENE GLYCOL 3350
17 POWDER (GRAM) MISCELLANEOUS DAILY
COMMUNITY

## 2023-09-07 RX ORDER — NITROFURANTOIN 25; 75 MG/1; MG/1
CAPSULE ORAL
COMMUNITY

## 2023-09-07 RX ORDER — PHENOL 1.4 %
1 AEROSOL, SPRAY (ML) MUCOUS MEMBRANE
COMMUNITY

## 2023-09-07 RX ORDER — PANTOPRAZOLE SODIUM 40 MG/10ML
INJECTION, POWDER, LYOPHILIZED, FOR SOLUTION INTRAVENOUS
COMMUNITY
Start: 2023-07-20

## 2023-09-08 ENCOUNTER — TELEPHONE (OUTPATIENT)
Age: 73
End: 2023-09-08

## 2023-09-08 ENCOUNTER — OFFICE VISIT (OUTPATIENT)
Age: 73
End: 2023-09-08
Payer: COMMERCIAL

## 2023-09-08 VITALS
BODY MASS INDEX: 22.84 KG/M2 | HEIGHT: 64 IN | WEIGHT: 133.8 LBS | DIASTOLIC BLOOD PRESSURE: 68 MMHG | SYSTOLIC BLOOD PRESSURE: 114 MMHG

## 2023-09-08 DIAGNOSIS — Z86.010 PERSONAL HISTORY OF COLONIC POLYPS: ICD-10-CM

## 2023-09-08 DIAGNOSIS — Z86.69 HISTORY OF SEIZURE DISORDER: ICD-10-CM

## 2023-09-08 DIAGNOSIS — Z12.11 COLON CANCER SCREENING: ICD-10-CM

## 2023-09-08 DIAGNOSIS — K21.9 GASTROESOPHAGEAL REFLUX DISEASE WITHOUT ESOPHAGITIS: ICD-10-CM

## 2023-09-08 DIAGNOSIS — K58.0 IRRITABLE BOWEL SYNDROME WITH DIARRHEA: Primary | ICD-10-CM

## 2023-09-08 PROBLEM — Z86.0100 PERSONAL HISTORY OF COLONIC POLYPS: Status: ACTIVE | Noted: 2023-09-08

## 2023-09-08 PROCEDURE — 99204 OFFICE O/P NEW MOD 45 MIN: CPT | Performed by: INTERNAL MEDICINE

## 2023-09-08 RX ORDER — FAMOTIDINE 40 MG/1
40 TABLET, FILM COATED ORAL 2 TIMES DAILY
COMMUNITY
Start: 2023-02-08 | End: 2024-02-08

## 2023-09-08 RX ORDER — ERGOCALCIFEROL 1.25 MG/1
CAPSULE ORAL
COMMUNITY

## 2023-09-08 RX ORDER — PANTOPRAZOLE SODIUM 40 MG/1
TABLET, DELAYED RELEASE ORAL
COMMUNITY

## 2023-09-08 NOTE — PROGRESS NOTES
[facility] - Outpatient Consultation  Arjun Brasher 72 y.o. female MRN: [id number]  Encounter: [id number]    ASSESSMENT AND PLAN:      1. Irritable bowel syndrome with diarrhea  --- Patient with a longstanding history of irritable bowel syndrome. Primarily patient had IBS D. Previous regimens included Donnatal, dicyclomine and a course of rifaximin had been given. Patient doing well on supplemental peppermint oil at this time. Not having so much issues with diarrhea predominant IBS as the peppermint seems to control this.  -Continue the same as needed. Patient actually on the constipated side at this time and has required Colace and MiraLAX  -Bowels have changed and that her regular routine since shelter and her 's illness have altered her schedule    2. Personal history of colonic polyps  -With longstanding history of colon polyps. Has been getting colonoscopies every 3 years. Is due around this time.  -We will schedule colonoscopy at our outpatient center in the near future    3. Gastroesophageal reflux disease without esophagitis  -Controlled on Protonix 40 mg daily and famotidine 40 mg at bedtime.  -Previous history of esophagitis but follow-up examination evidently improved. Will review records  -Probably does not require EGD at this time - no history of Odonnell's by her report     4. Colon cancer screening  --Increased risk by virtue of the fact that she had polyps in her father had colon cancer in his 46s  - Colonoscopy; Future    5. History of seizure disorder  --No seizures for 20 years. Continues on phenobarbital      Followup Appointment:   6 mo   ______________________________________________________________________    Chief Complaint   Patient presents with   • GI consult     Pt has experiencing GI issues since she was a child.  She would like to establish care with new GI specialist.       HPI:   William Faulkner is a 72y.o. year old female who presents for evaluation for transfer GI care. Her former gastroenterologist is with a group that does not accept her present insurance. Patient does report longstanding history of irritable bowel syndrome. Primarily patient would have problems with crampy lower abdominal pain and diarrhea. More recently she has had not so much in the way of diarrhea but constipation. She has been on multiple antispasmodics in the past and had received treatments for IBS including dicyclomine and Donnatal.  She also at 1 time out of course of rifaximin. Presently she has been taking peppermint supplements with good results and this controls her lower abdominal cramping and pain. Patient was hospitalized in March 2022 with ileitis as noted by CAT scan. She responded to supportive care and IV fluids. Patient did have elevated temperature at that time and leukocytosis. Patient felt as though she had "a severe IBS attack". Illness was characterized by nausea vomiting and protracted diarrhea nonbloody which subsequently resolved. Felt likely to be of an infectious etiology. Patient had an ED visit in the fall 2022 and at that time had a CAT scan which showed some thickening in the descending colon possibly consistent with colitis and increased stool burden. She was not hospitalized at that time. Patient more recently she has had some problems even with constipation and has had to take MiraLAX and Colace. Patient does report that she has a longstanding history of colon polyps. She gets colonoscopy every 3 years and has had polyps on each exam.  She reports that she would be due for examination at this time. She also informs me that she has a somewhat technically difficult" tortuous colon. Her last colonoscopy was 3 years ago. She does have a family history of colon cancer and that her father was diagnosed with colon cancer in his 46s. Patient also has a longstanding history of GERD.   She has had upper endoscopies in the past but does not have Odonnell's esophagus. Symptoms are under control on Protonix and famotidine in the evening. No swallowing difficulties. She does have intermittent problems with belching.       Historical Information   Past Medical History:   Diagnosis Date   • Colon polyp 1988   • GERD (gastroesophageal reflux disease) 2004   • Irritable bowel syndrome 1959     Past Surgical History:   Procedure Laterality Date   • APPENDECTOMY  1959   • CHOLECYSTECTOMY  1985   • COLONOSCOPY  2020   • TUBAL LIGATION  1988   • UPPER GASTROINTESTINAL ENDOSCOPY  2020     Social History     Substance and Sexual Activity   Alcohol Use Yes   • Alcohol/week: 1.0 standard drink of alcohol   • Types: 1 Glasses of wine per week     Social History     Substance and Sexual Activity   Drug Use Never     Social History     Tobacco Use   Smoking Status Never   Smokeless Tobacco Never     Family History   Problem Relation Age of Onset   • Depression Mother    • Heart disease Mother    • Hyperlipidemia Mother    • Hypertension Mother    • Irritable bowel syndrome Mother    • Arthritis Father    • Cancer Father    • Colon polyps Father    • Heart disease Father    • Hyperlipidemia Father    • Hypertension Father    • Hyperlipidemia Sister    • Hypertension Sister    • Miscarriages / Stillbirths Sister    • Hyperlipidemia Brother        Meds/Allergies     Current Outpatient Medications:   •  ascorbic acid (VITAMIN C) 1000 MG tablet  •  cetirizine (ZyrTEC) 5 MG tablet  •  CHOLECALCIFEROL PO  •  denosumab (PROLIA) 60 mg/mL  •  ergocalciferol (ERGOCALCIFEROL) 1.25 MG (70933 UT) capsule  •  famotidine (PEPCID) 40 MG tablet  •  Melatonin 10 MG TABS  •  montelukast (SINGULAIR) 10 mg tablet  •  ondansetron (ZOFRAN-ODT) 4 mg disintegrating tablet  •  pantoprazole (PROTONIX) 40 mg tablet  •  PHENobarbital 60 mg tablet  •  Polyethylene Glycol 3350 POWD  •  atropine-phenobarbital-scopolamine-hyoscyamine (DONNATAL) 16.2 MG TABS  • cholestyramine (QUESTRAN) 4 g packet  •  dicyclomine (BENTYL) 10 mg capsule  •  famotidine (PEPCID) 10 mg tablet  •  fexofenadine (ALLEGRA) 60 MG tablet  •  fluticasone (FLONASE) 50 mcg/act nasal spray  •  lidocaine-prilocaine (EMLA) cream  •  meloxicam (MOBIC) 7.5 mg tablet  •  nitrofurantoin (MACROBID) 100 mg capsule  •  Oxymetazoline HCl (Nasal Spray) 0.05 % SOLN  •  pantoprazole (PROTONIX) 40 mg injection  •  rifaximin (XIFAXAN) 550 mg tablet  •  Wheat Dextrin (BENEFIBER PO)    Allergies   Allergen Reactions   • Pollen Extract Allergic Rhinitis       PHYSICAL EXAM:    Blood pressure 114/68, height 5' 3.5" (1.613 m), weight 60.7 kg (133 lb 12.8 oz). Body mass index is 23.33 kg/m². General Appearance: NAD, cooperative, alert  Eyes: Anicteric, conjunctiva pink  ENT:  Normocephalic, atraumatic, normal mucosa. Neck:  Supple, symmetrical, trachea midline,   Resp:  Clear to auscultation bilaterally; no rales, rhonchi or wheezing; respirations unlabored   CV:  S1 S2, Regular rate and rhythm; no murmur, rub, or gallop. GI:  Soft, mild diffuse tenderness, non-distended; normal bowel sounds; no masses, no organomegaly   Rectal: Deferred  Musculoskeletal: No cyanosis, clubbing or edema. Normal ROM. Skin:  No jaundice, rashes, or lesions   Heme/Lymph: No palpable cervical lymphadenopathy  Psych: Normal affect, good eye contact  Neuro: No gross deficits, AAOx3          REVIEW OF SYSTEMS:    CONSTITUTIONAL: Denies any fever, chills, rigors, weight gain- mild . HEENT: No earache or tinnitus. Decreased hearing . -- PND  CARDIOVASCULAR: No chest pain or palpitations. RESPIRATORY: Denies any cough, hemoptysis, shortness of breath or dyspnea on exertion. GASTROINTESTINAL: As noted in the History of Present Illness. GENITOURINARY: No problems with urination. Denies any hematuria or dysuria. NEUROLOGIC: No dizziness or vertigo, denies headaches.    MUSCULOSKELETAL:Positive for knee pain   SKIN: Denies skin rashes or itching. ENDOCRINE: Denies excessive thirst. Denies intolerance to heat or cold. PSYCHOSOCIAL: Denies depression or anxiety. Denies any recent memory loss.

## 2023-09-08 NOTE — LETTER
September 8, 2023     Nicolasa Reynolds, [address]    Patient: Jim Ramos   YOB: 1950   Date of Visit: 9/8/2023       Dear Dr. Heather Ennis:    Thank you for referring Jim Ramos to me for evaluation. Below are my notes for this consultation. If you have questions, please do not hesitate to call me. I look forward to following your patient along with you. Sincerely,        Rochelle Moore MD        CC: No Recipients    Rochelle Moore MD  9/8/2023  6:44 PM  Sign when Signing Visit      [facility] - Outpatient Consultation  Jim Ramos 72 y.o. female MRN: [id number]  Encounter: [id number]    ASSESSMENT AND PLAN:      1. Irritable bowel syndrome with diarrhea  --- Patient with a longstanding history of irritable bowel syndrome. Primarily patient had IBS D. Previous regimens included Donnatal, dicyclomine and a course of rifaximin had been given. Patient doing well on supplemental peppermint oil at this time. Not having so much issues with diarrhea predominant IBS as the peppermint seems to control this.  -Continue the same as needed. Patient actually on the constipated side at this time and has required Colace and MiraLAX  -Bowels have changed and that her regular routine since halfway and her 's illness have altered her schedule    2. Personal history of colonic polyps  -With longstanding history of colon polyps. Has been getting colonoscopies every 3 years. Is due around this time.  -We will schedule colonoscopy at our outpatient center in the near future    3. Gastroesophageal reflux disease without esophagitis  -Controlled on Protonix 40 mg daily and famotidine 40 mg at bedtime.  -Previous history of esophagitis but follow-up examination evidently improved. Will review records  -Probably does not require EGD at this time - no history of Odonnell's by her report     4.  Colon cancer screening  --Increased risk by virtue of the fact that she had polyps in her father had colon cancer in his 46s  - Colonoscopy; Future    5. History of seizure disorder  --No seizures for 20 years. Continues on phenobarbital      Followup Appointment:   6 mo   ______________________________________________________________________    Chief Complaint   Patient presents with   • GI consult     Pt has experiencing GI issues since she was a child. She would like to establish care with new GI specialist.       HPI:   Elvin Pastrana is a 72y.o. year old female who presents for evaluation for transfer GI care. Her former gastroenterologist is with a group that does not accept her present insurance. Patient does report longstanding history of irritable bowel syndrome. Primarily patient would have problems with crampy lower abdominal pain and diarrhea. More recently she has had not so much in the way of diarrhea but constipation. She has been on multiple antispasmodics in the past and had received treatments for IBS including dicyclomine and Donnatal.  She also at 1 time out of course of rifaximin. Presently she has been taking peppermint supplements with good results and this controls her lower abdominal cramping and pain. Patient was hospitalized in March 2022 with ileitis as noted by CAT scan. She responded to supportive care and IV fluids. Patient did have elevated temperature at that time and leukocytosis. Patient felt as though she had "a severe IBS attack". Illness was characterized by nausea vomiting and protracted diarrhea nonbloody which subsequently resolved. Felt likely to be of an infectious etiology. Patient had an ED visit in the fall 2022 and at that time had a CAT scan which showed some thickening in the descending colon possibly consistent with colitis and increased stool burden. She was not hospitalized at that time.   Patient more recently she has had some problems even with constipation and has had to take MiraLAX and Colace. Patient does report that she has a longstanding history of colon polyps. She gets colonoscopy every 3 years and has had polyps on each exam.  She reports that she would be due for examination at this time. She also informs me that she has a somewhat technically difficult" tortuous colon. Her last colonoscopy was 3 years ago. She does have a family history of colon cancer and that her father was diagnosed with colon cancer in his 46s. Patient also has a longstanding history of GERD. She has had upper endoscopies in the past but does not have Odonnell's esophagus. Symptoms are under control on Protonix and famotidine in the evening. No swallowing difficulties. She does have intermittent problems with belching.       Historical Information   Past Medical History:   Diagnosis Date   • Colon polyp 1988   • GERD (gastroesophageal reflux disease) 2004   • Irritable bowel syndrome 1959     Past Surgical History:   Procedure Laterality Date   • APPENDECTOMY  1959   • CHOLECYSTECTOMY  1985   • COLONOSCOPY  2020   • TUBAL LIGATION  1988   • UPPER GASTROINTESTINAL ENDOSCOPY  2020     Social History     Substance and Sexual Activity   Alcohol Use Yes   • Alcohol/week: 1.0 standard drink of alcohol   • Types: 1 Glasses of wine per week     Social History     Substance and Sexual Activity   Drug Use Never     Social History     Tobacco Use   Smoking Status Never   Smokeless Tobacco Never     Family History   Problem Relation Age of Onset   • Depression Mother    • Heart disease Mother    • Hyperlipidemia Mother    • Hypertension Mother    • Irritable bowel syndrome Mother    • Arthritis Father    • Cancer Father    • Colon polyps Father    • Heart disease Father    • Hyperlipidemia Father    • Hypertension Father    • Hyperlipidemia Sister    • Hypertension Sister    • Miscarriages / Stillbirths Sister    • Hyperlipidemia Brother        Meds/Allergies     Current Outpatient Medications:   •  ascorbic acid (VITAMIN C) 1000 MG tablet  •  cetirizine (ZyrTEC) 5 MG tablet  •  CHOLECALCIFEROL PO  •  denosumab (PROLIA) 60 mg/mL  •  ergocalciferol (ERGOCALCIFEROL) 1.25 MG (15923 UT) capsule  •  famotidine (PEPCID) 40 MG tablet  •  Melatonin 10 MG TABS  •  montelukast (SINGULAIR) 10 mg tablet  •  ondansetron (ZOFRAN-ODT) 4 mg disintegrating tablet  •  pantoprazole (PROTONIX) 40 mg tablet  •  PHENobarbital 60 mg tablet  •  Polyethylene Glycol 3350 POWD  •  atropine-phenobarbital-scopolamine-hyoscyamine (DONNATAL) 16.2 MG TABS  •  cholestyramine (QUESTRAN) 4 g packet  •  dicyclomine (BENTYL) 10 mg capsule  •  famotidine (PEPCID) 10 mg tablet  •  fexofenadine (ALLEGRA) 60 MG tablet  •  fluticasone (FLONASE) 50 mcg/act nasal spray  •  lidocaine-prilocaine (EMLA) cream  •  meloxicam (MOBIC) 7.5 mg tablet  •  nitrofurantoin (MACROBID) 100 mg capsule  •  Oxymetazoline HCl (Nasal Spray) 0.05 % SOLN  •  pantoprazole (PROTONIX) 40 mg injection  •  rifaximin (XIFAXAN) 550 mg tablet  •  Wheat Dextrin (BENEFIBER PO)    Allergies   Allergen Reactions   • Pollen Extract Allergic Rhinitis       PHYSICAL EXAM:    Blood pressure 114/68, height 5' 3.5" (1.613 m), weight 60.7 kg (133 lb 12.8 oz). Body mass index is 23.33 kg/m². General Appearance: NAD, cooperative, alert  Eyes: Anicteric, conjunctiva pink  ENT:  Normocephalic, atraumatic, normal mucosa. Neck:  Supple, symmetrical, trachea midline,   Resp:  Clear to auscultation bilaterally; no rales, rhonchi or wheezing; respirations unlabored   CV:  S1 S2, Regular rate and rhythm; no murmur, rub, or gallop. GI:  Soft, mild diffuse tenderness, non-distended; normal bowel sounds; no masses, no organomegaly   Rectal: Deferred  Musculoskeletal: No cyanosis, clubbing or edema. Normal ROM.   Skin:  No jaundice, rashes, or lesions   Heme/Lymph: No palpable cervical lymphadenopathy  Psych: Normal affect, good eye contact  Neuro: No gross deficits, AAOx3          REVIEW OF SYSTEMS:    CONSTITUTIONAL: Denies any fever, chills, rigors, weight gain- mild . HEENT: No earache or tinnitus. Decreased hearing . -- PND  CARDIOVASCULAR: No chest pain or palpitations. RESPIRATORY: Denies any cough, hemoptysis, shortness of breath or dyspnea on exertion. GASTROINTESTINAL: As noted in the History of Present Illness. GENITOURINARY: No problems with urination. Denies any hematuria or dysuria. NEUROLOGIC: No dizziness or vertigo, denies headaches. MUSCULOSKELETAL:Positive for knee pain   SKIN: Denies skin rashes or itching. ENDOCRINE: Denies excessive thirst. Denies intolerance to heat or cold. PSYCHOSOCIAL: Denies depression or anxiety. Denies any recent memory loss.

## 2023-09-08 NOTE — TELEPHONE ENCOUNTER
Scheduled date of colonoscopy (as of today):10/18/23  Physician performing colonoscopy:GRETCHEN  Location of colonoscopy:[facility]  Bowel prep reviewed with patient:Miralax/Dulcolax  Instructions reviewed with patient by:LEO  Clearances: N

## 2023-09-08 NOTE — LETTER
September 8, 2023     [facility], [address] [place] [address]    Patient: Mario Alberto Barksdale   YOB: 1950   Date of Visit: 9/8/2023       Dear Dr. Cardozo Friends:    Thank you for referring Mario Alberto Barksdale to me for evaluation. Below are my notes for this consultation. If you have questions, please do not hesitate to call me. I look forward to following your patient along with you. Sincerely,        Bhargav Salter MD        CC: No Recipients    Bhargav Salter MD  9/8/2023  6:43 PM  Incomplete      [facility] - Outpatient Consultation  Mario Alberto Barksdale 72 y.o. female MRN: [id number]  Encounter: [id number]    ASSESSMENT AND PLAN:      1. Irritable bowel syndrome with diarrhea  --- Patient with a longstanding history of irritable bowel syndrome. Primarily patient had IBS D. Previous regimens included Donnatal, dicyclomine and a course of rifaximin had been given. Patient doing well on supplemental peppermint oil at this time. Not having so much issues with diarrhea predominant IBS as the peppermint seems to control this.  -Continue the same as needed. Patient actually on the constipated side at this time and has required Colace and MiraLAX  -Bowels have changed and that her regular routine since [facility] and her 's illness have altered her schedule    2. Personal history of colonic polyps  -With longstanding history of colon polyps. Has been getting colonoscopies every 3 years. Is due around this time.  -We will schedule colonoscopy at our outpatient center in the near future    3. Gastroesophageal reflux disease without esophagitis  -Controlled on Protonix 40 mg daily and famotidine 40 mg at bedtime.  -Previous history of esophagitis but follow-up examination evidently improved. Will review records  -Probably does not require EGD at this time - no history of Odonnell's by her report     4.  Colon cancer screening  --Increased risk by virtue of the fact that she had polyps in her father had colon cancer in his 46s  - Colonoscopy; Future    5. History of seizure disorder  --No seizures for 20 years. Continues on phenobarbital      Followup Appointment:   6 mo   ______________________________________________________________________    Chief Complaint   Patient presents with   • GI consult     Pt has experiencing GI issues since she was a child. She would like to establish care with new GI specialist.       HPI:   Thelbert Heimlich is a 72y.o. year old female who presents for evaluation for transfer GI care. Her former gastroenterologist is with a group that does not accept her present insurance. Patient does report longstanding history of irritable bowel syndrome. Primarily patient would have problems with crampy lower abdominal pain and diarrhea. More recently she has had not so much in the way of diarrhea but constipation. She has been on multiple antispasmodics in the past and had received treatments for IBS including dicyclomine and Donnatal.  She also at 1 time out of course of rifaximin. Presently she has been taking peppermint supplements with good results and this controls her lower abdominal cramping and pain. Patient was hospitalized in March 2022 with ileitis as noted by CAT scan. She responded to supportive care and IV fluids. Patient did have elevated temperature at that time and leukocytosis. Patient felt as though she had "a severe IBS attack". Illness was characterized by nausea vomiting and protracted diarrhea nonbloody which subsequently resolved. Felt likely to be of an infectious etiology. Patient had an ED visit in the fall 2022 and at that time had a CAT scan which showed some thickening in the descending colon possibly consistent with colitis and increased stool burden. She was not hospitalized at that time.   Patient more recently she has had some problems even with constipation and has had to take MiraLAX and ace. Patient does report that she has a longstanding history of colon polyps. She gets colonoscopy every 3 years and has had polyps on each exam.  She reports that she would be due for examination at this time. She also informs me that she has a somewhat technically difficult" tortuous colon. Her last colonoscopy was 3 years ago. She does have a family history of colon cancer and that her father was diagnosed with colon cancer in his 46s. Patient also has a longstanding history of GERD. She has had upper endoscopies in the past but does not have Odonnell's esophagus. Symptoms are under control on Protonix and famotidine in the evening. No swallowing difficulties. She does have intermittent problems with belching.       Historical Information   Past Medical History:   Diagnosis Date   • Colon polyp 1988   • GERD (gastroesophageal reflux disease) 2004   • Irritable bowel syndrome 1959     Past Surgical History:   Procedure Laterality Date   • APPENDECTOMY  1959   • CHOLECYSTECTOMY  1985   • COLONOSCOPY  2020   • TUBAL LIGATION  1988   • UPPER GASTROINTESTINAL ENDOSCOPY  2020     Social History     Substance and Sexual Activity   Alcohol Use Yes   • Alcohol/week: 1.0 standard drink of alcohol   • Types: 1 Glasses of wine per week     Social History     Substance and Sexual Activity   Drug Use Never     Social History     Tobacco Use   Smoking Status Never   Smokeless Tobacco Never     Family History   Problem Relation Age of Onset   • Depression Mother    • Heart disease Mother    • Hyperlipidemia Mother    • Hypertension Mother    • Irritable bowel syndrome Mother    • Arthritis Father    • Cancer Father    • Colon polyps Father    • Heart disease Father    • Hyperlipidemia Father    • Hypertension Father    • Hyperlipidemia Sister    • Hypertension Sister    • Miscarriages / Stillbirths Sister    • Hyperlipidemia Brother        Meds/Allergies     Current Outpatient Medications:   •  ascorbic acid (VITAMIN C) 1000 MG tablet  •  cetirizine (ZyrTEC) 5 MG tablet  •  CHOLECALCIFEROL PO  •  denosumab (PROLIA) 60 mg/mL  •  ergocalciferol (ERGOCALCIFEROL) 1.25 MG (54930 UT) capsule  •  famotidine (PEPCID) 40 MG tablet  •  Melatonin 10 MG TABS  •  montelukast (SINGULAIR) 10 mg tablet  •  ondansetron (ZOFRAN-ODT) 4 mg disintegrating tablet  •  pantoprazole (PROTONIX) 40 mg tablet  •  PHENobarbital 60 mg tablet  •  Polyethylene Glycol 3350 POWD  •  atropine-phenobarbital-scopolamine-hyoscyamine (DONNATAL) 16.2 MG TABS  •  cholestyramine (QUESTRAN) 4 g packet  •  dicyclomine (BENTYL) 10 mg capsule  •  famotidine (PEPCID) 10 mg tablet  •  fexofenadine (ALLEGRA) 60 MG tablet  •  fluticasone (FLONASE) 50 mcg/act nasal spray  •  lidocaine-prilocaine (EMLA) cream  •  meloxicam (MOBIC) 7.5 mg tablet  •  nitrofurantoin (MACROBID) 100 mg capsule  •  Oxymetazoline HCl (Nasal Spray) 0.05 % SOLN  •  pantoprazole (PROTONIX) 40 mg injection  •  rifaximin (XIFAXAN) 550 mg tablet  •  Wheat Dextrin (BENEFIBER PO)    Allergies   Allergen Reactions   • Pollen Extract Allergic Rhinitis       PHYSICAL EXAM:    Blood pressure 114/68, height 5' 3.5" (1.613 m), weight 60.7 kg (133 lb 12.8 oz). Body mass index is 23.33 kg/m². General Appearance: NAD, cooperative, alert  Eyes: Anicteric, conjunctiva pink  ENT:  Normocephalic, atraumatic, normal mucosa. Neck:  Supple, symmetrical, trachea midline,   Resp:  Clear to auscultation bilaterally; no rales, rhonchi or wheezing; respirations unlabored   CV:  S1 S2, Regular rate and rhythm; no murmur, rub, or gallop. GI:  Soft, mild diffuse tenderness, non-distended; normal bowel sounds; no masses, no organomegaly   Rectal: Deferred  Musculoskeletal: No cyanosis, clubbing or edema. Normal ROM.   Skin:  No jaundice, rashes, or lesions   Heme/Lymph: No palpable cervical lymphadenopathy  Psych: Normal affect, good eye contact  Neuro: No gross deficits, AAOx3          REVIEW OF SYSTEMS:    CONSTITUTIONAL: Denies any fever, chills, rigors, weight gain- mild . HEENT: No earache or tinnitus. Decreased hearing . -- PND  CARDIOVASCULAR: No chest pain or palpitations. RESPIRATORY: Denies any cough, hemoptysis, shortness of breath or dyspnea on exertion. GASTROINTESTINAL: As noted in the History of Present Illness. GENITOURINARY: No problems with urination. Denies any hematuria or dysuria. NEUROLOGIC: No dizziness or vertigo, denies headaches. MUSCULOSKELETAL:Positive for knee pain   SKIN: Denies skin rashes or itching. ENDOCRINE: Denies excessive thirst. Denies intolerance to heat or cold. PSYCHOSOCIAL: Denies depression or anxiety. Denies any recent memory loss. Terell Roca MD  9/8/2023  4:16 PM  Sign when Signing Visit  Answers for HPI/ROS submitted by the patient on 9/1/2023  Chronicity: new  Onset: today  Frequency: rarely  Progression since onset: resolved  anorexia: No  arthralgias: No  belching: Yes  constipation: Yes  diarrhea: No  dysuria: No  fever: No  flatus: Yes  frequency: No  headaches: No  hematochezia: No  hematuria: No  melena: No  myalgias: No  nausea: No  weight loss: No  vomiting: No  Aggravated by: nothing  Diagnostic workup: CT scan, ultrasound      [facility] - Outpatient Consultation  Aminah Austin 72 y.o. female MRN: [id number]  Encounter: [id number]    ASSESSMENT AND PLAN:      1. Irritable bowel syndrome with diarrhea  --- Patient with a longstanding history of irritable bowel syndrome. Primarily patient had IBS D. Previous regimens included Donnatal, dicyclomine and a course of rifaximin had been given. Patient doing well on supplemental peppermint oil at this time. Not having so much issues with diarrhea predominant IBS as the peppermint seems to control this.  -Continue the same as needed.   Patient actually on the constipated side at this time and has required Colace and MiraLAX  -Bowels have changed and that her regular routine since senior care and her 's illness have altered her schedule    2. Personal history of colonic polyps  -With longstanding history of colon polyps. Has been getting colonoscopies every 3 years. Is due around this time.  -We will schedule colonoscopy at our outpatient center in the near future    3. Gastroesophageal reflux disease without esophagitis  -Controlled on Protonix 40 mg daily and famotidine 40 mg at bedtime.  -Previous history of esophagitis but follow-up examination evidently improved. Will review records    4. Colon cancer screening  --Increased risk by virtue of the fact that she had polyps in her father had colon cancer in his 46s  - Colonoscopy; Future    5. History of seizure disorder  --No seizures for 20 years. Continues on phenobarbital      Followup Appointment:   6 mo   ______________________________________________________________________    Chief Complaint   Patient presents with   • GI consult     Pt has experiencing GI issues since she was a child.  She would like to establish care with new GI specialist.       HPI:   Denzel Bernal is a 72y.o. year old female who presents ***    Historical Information   Past Medical History:   Diagnosis Date   • Colon polyp 1988   • GERD (gastroesophageal reflux disease) 2004   • Irritable bowel syndrome 1959     Past Surgical History:   Procedure Laterality Date   • APPENDECTOMY  1959   • CHOLECYSTECTOMY  1985   • COLONOSCOPY  2020   • TUBAL LIGATION  1988   • UPPER GASTROINTESTINAL ENDOSCOPY  2020     Social History     Substance and Sexual Activity   Alcohol Use Yes   • Alcohol/week: 1.0 standard drink of alcohol   • Types: 1 Glasses of wine per week     Social History     Substance and Sexual Activity   Drug Use Never     Social History     Tobacco Use   Smoking Status Never   Smokeless Tobacco Never     Family History   Problem Relation Age of Onset   • Depression Mother    • Heart disease Mother    • Hyperlipidemia Mother    • Hypertension Mother    • Irritable bowel syndrome Mother    • Arthritis Father    • Cancer Father    • Colon polyps Father    • Heart disease Father    • Hyperlipidemia Father    • Hypertension Father    • Hyperlipidemia Sister    • Hypertension Sister    • Miscarriages / Stillbirths Sister    • Hyperlipidemia Brother        Meds/Allergies     Current Outpatient Medications:   •  ascorbic acid (VITAMIN C) 1000 MG tablet  •  cetirizine (ZyrTEC) 5 MG tablet  •  CHOLECALCIFEROL PO  •  denosumab (PROLIA) 60 mg/mL  •  ergocalciferol (ERGOCALCIFEROL) 1.25 MG (64227 UT) capsule  •  famotidine (PEPCID) 40 MG tablet  •  Melatonin 10 MG TABS  •  montelukast (SINGULAIR) 10 mg tablet  •  ondansetron (ZOFRAN-ODT) 4 mg disintegrating tablet  •  pantoprazole (PROTONIX) 40 mg tablet  •  PHENobarbital 60 mg tablet  •  Polyethylene Glycol 3350 POWD  •  atropine-phenobarbital-scopolamine-hyoscyamine (DONNATAL) 16.2 MG TABS  •  cholestyramine (QUESTRAN) 4 g packet  •  dicyclomine (BENTYL) 10 mg capsule  •  famotidine (PEPCID) 10 mg tablet  •  fexofenadine (ALLEGRA) 60 MG tablet  •  fluticasone (FLONASE) 50 mcg/act nasal spray  •  lidocaine-prilocaine (EMLA) cream  •  meloxicam (MOBIC) 7.5 mg tablet  •  nitrofurantoin (MACROBID) 100 mg capsule  •  Oxymetazoline HCl (Nasal Spray) 0.05 % SOLN  •  pantoprazole (PROTONIX) 40 mg injection  •  rifaximin (XIFAXAN) 550 mg tablet  •  Wheat Dextrin (BENEFIBER PO)    Allergies   Allergen Reactions   • Pollen Extract Allergic Rhinitis       PHYSICAL EXAM:    Blood pressure 114/68, height 5' 3.5" (1.613 m), weight 60.7 kg (133 lb 12.8 oz). Body mass index is 23.33 kg/m². General Appearance: NAD, cooperative, alert  Eyes: Anicteric, PERRLA, EOMI  ENT:  Normocephalic, atraumatic, normal mucosa.     Neck:  Supple, symmetrical, trachea midline,   Resp:  Clear to auscultation bilaterally; no rales, rhonchi or wheezing; respirations unlabored   CV:  S1 S2, Regular rate and rhythm; no murmur, rub, or gallop. GI:  Soft, non-tender, non-distended; normal bowel sounds; no masses, no organomegaly   Rectal: Deferred  Musculoskeletal: No cyanosis, clubbing or edema. Normal ROM. Skin:  No jaundice, rashes, or lesions   Heme/Lymph: No palpable cervical lymphadenopathy  Psych: Normal affect, good eye contact  Neuro: No gross deficits, AAOx3          REVIEW OF SYSTEMS:    CONSTITUTIONAL: Denies any fever, chills, rigors, weight gain- mild . HEENT: No earache or tinnitus. Decreased hearing . -- PND  CARDIOVASCULAR: No chest pain or palpitations. RESPIRATORY: Denies any cough, hemoptysis, shortness of breath or dyspnea on exertion. GASTROINTESTINAL: As noted in the History of Present Illness. GENITOURINARY: No problems with urination. Denies any hematuria or dysuria. NEUROLOGIC: No dizziness or vertigo, denies headaches. MUSCULOSKELETAL:Positive for knee pain   SKIN: Denies skin rashes or itching. ENDOCRINE: Denies excessive thirst. Denies intolerance to heat or cold. PSYCHOSOCIAL: Denies depression or anxiety. Denies any recent memory loss.

## 2023-09-08 NOTE — PATIENT INSTRUCTIONS
[facility] - Outpatient [facility]  Arjun Brasher 72 y.o. female MRN: [id number]  Encounter: [id number]    ASSESSMENT AND PLAN:      1. Irritable bowel syndrome with diarrhea  --- Patient with a longstanding history of irritable bowel syndrome. Primarily patient had IBS D. Previous regimens included Donnatal, dicyclomine and a course of rifaximin had been given. Patient doing well on supplemental peppermint oil at this time. Not having so much issues with diarrhea predominant IBS as the peppermint seems to control this.  -Continue the same as needed. Patient actually on the constipated side at this time and has required Colace and MiraLAX  -Bowels have changed and that her regular routine since [facility] and her 's illness have altered her schedule    2. Personal history of colonic polyps  -With longstanding history of colon polyps. Has been getting colonoscopies every 3 years. Is due around this time.  -We will schedule colonoscopy at our outpatient center in the near future    3. Gastroesophageal reflux disease without esophagitis  -Controlled on Protonix 40 mg daily and famotidine 40 mg at bedtime.  -Previous history of esophagitis but follow-up examination evidently improved. Will review records  -Probably does not require EGD at this time - no history of Odonnell's by her report     4. Colon cancer screening  --Increased risk by virtue of the fact that she had polyps in her father had colon cancer in his 46s  - Colonoscopy; Future    5. History of seizure disorder  --No seizures for 20 years.   Continues on phenobarbital      Followup Appointment:   6 mo

## 2023-09-21 ENCOUNTER — TELEPHONE (OUTPATIENT)
Dept: GASTROENTEROLOGY | Facility: CLINIC | Age: 73
End: 2023-09-21

## 2023-09-21 NOTE — TELEPHONE ENCOUNTER
I called the patient and advised that I had reviewed her previous endoscopy records. Last colonoscopy and upper endoscopy was performed by Dr. Delilah Lester at [address] just had some hyperplastic polyps and lymphoid follicles. No adenomas. Recall was advised for 5 years as opposed to 3 by his notation. In addition EGD performed mild gastritis no H. pylori. No Odonnell's esophagus. No need for EGD at this time or for surveillance EGD. Patient longstanding IBS. At this stage we will cancel scheduled colonoscopy and have the patient come back for routine follow-up in the office 6 months. Patient is agreeable. She can call for problems in the interim.

## 2023-09-25 ENCOUNTER — TELEPHONE (OUTPATIENT)
Dept: SCHEDULING | Facility: CLINIC | Age: 73
End: 2023-09-25
Payer: COMMERCIAL

## 2023-09-25 NOTE — TELEPHONE ENCOUNTER
New Patient Appointment Request    Name of caller: Trudi Patel    Reason for Visit: Evaluation after Calcium Score test    Insurance: [facility]    Recent Cardiac Test/Procedures: none    Referred by:Abigail Gardner DO    Primary Care Physician: Abigail Gardner DO     Previous Cardiologist name and phone number:      Best contact number: [phone]    Additional notes/History: Scheduled for 10/31, will have calcium score done nest week remi trejo

## 2023-09-25 NOTE — TELEPHONE ENCOUNTER
Dr. Javier NP/est scheduled for 10/31/23.  New Patient Visit Questionnaire  Use the F2 key to move through the asterisks.  Reason for appointment? Re establish cardiac care for high cholesterol. No symptoms  Calcium score test being done 9/28/23   recommended by PCP    Who referred you: PCP    Name of primary care physician Conchis Gardner    Has the patient ever been seen by a cardiologist before?  Yes             If YES, please obtain name and location of previous cardiologist.[facility]  Do you give us permission to obtain Medical records from the Providers listed? Yes    Have you had any recent lab work performed? Yes       If yes, where was this performed?[facility]    Have you ever had any cardiac testing (echo, stress test, carotid or aortic ultrasounds, cardiac MRI, etc.) 2019 G    Have you ever had a cardiac catheterization, angioplasty, stent or heart surgery?  No    Have you had any recent hospitalizations?  No    If the patient has had previous testing/hospitalization, please determine where and when this was performed.  If the patient has copies of these reports or discharge summaries, please instruct them bring records to the visit.     PATIENT INSTRUCTIONS   Please bring a list of all your medications with the name of the medication, the dosage and how frequently you take the medication.  If you do not have a list, please bring all of your medication bottles with you.

## 2023-09-26 DIAGNOSIS — K58.0 IRRITABLE BOWEL SYNDROME WITH DIARRHEA: Primary | ICD-10-CM

## 2023-09-26 RX ORDER — PANTOPRAZOLE SODIUM 40 MG/1
40 TABLET, DELAYED RELEASE ORAL DAILY
Qty: 30 TABLET | Refills: 2 | Status: SHIPPED | OUTPATIENT
Start: 2023-09-26 | End: 2023-12-25

## 2023-10-19 NOTE — TELEPHONE ENCOUNTER
I spoke to Patricia, advised no sooner appts before 10-  Advised we would call if there is a cancellation . thx

## 2023-10-19 NOTE — TELEPHONE ENCOUNTER
Pt called, states that her PCP informed her that she is at risk of heart attack and wants the pt to be seen as soon as possible with Dr. Javier    No sooner appts available, please advise.    Pt can be reached at [phone]

## 2023-10-31 ENCOUNTER — OFFICE VISIT (OUTPATIENT)
Dept: CARDIOLOGY | Facility: CLINIC | Age: 73
End: 2023-10-31
Payer: COMMERCIAL

## 2023-10-31 VITALS
BODY MASS INDEX: 23.14 KG/M2 | SYSTOLIC BLOOD PRESSURE: 122 MMHG | HEIGHT: 63 IN | DIASTOLIC BLOOD PRESSURE: 74 MMHG | RESPIRATION RATE: 16 BRPM | WEIGHT: 130.6 LBS | HEART RATE: 63 BPM

## 2023-10-31 DIAGNOSIS — R00.2 PALPITATIONS: ICD-10-CM

## 2023-10-31 DIAGNOSIS — I25.10 CORONARY ARTERY CALCIFICATION SEEN ON CAT SCAN: Primary | ICD-10-CM

## 2023-10-31 DIAGNOSIS — R06.02 SHORTNESS OF BREATH: ICD-10-CM

## 2023-10-31 DIAGNOSIS — E78.00 ELEVATED LDL CHOLESTEROL LEVEL: ICD-10-CM

## 2023-10-31 DIAGNOSIS — R07.89 SENSATION OF CHEST TIGHTNESS: ICD-10-CM

## 2023-10-31 PROCEDURE — 3008F BODY MASS INDEX DOCD: CPT | Performed by: INTERNAL MEDICINE

## 2023-10-31 PROCEDURE — 93000 ELECTROCARDIOGRAM COMPLETE: CPT | Performed by: INTERNAL MEDICINE

## 2023-10-31 PROCEDURE — 99205 OFFICE O/P NEW HI 60 MIN: CPT | Performed by: INTERNAL MEDICINE

## 2023-10-31 RX ORDER — MONTELUKAST SODIUM 10 MG/1
10 TABLET ORAL DAILY
COMMUNITY
Start: 2023-10-31

## 2023-10-31 RX ORDER — ROSUVASTATIN CALCIUM 5 MG/1
5 TABLET, COATED ORAL DAILY
Qty: 90 EACH | Refills: 1 | Status: SHIPPED | OUTPATIENT
Start: 2023-10-31 | End: 2024-10-11 | Stop reason: SDUPTHER

## 2023-10-31 RX ORDER — ONDANSETRON 4 MG/1
TABLET, ORALLY DISINTEGRATING ORAL
COMMUNITY

## 2023-10-31 RX ORDER — PHENOBARBITAL 60 MG/1
60 TABLET ORAL EVERY EVENING
Refills: 0 | COMMUNITY
Start: 2023-10-31

## 2023-10-31 NOTE — PROGRESS NOTES
"                     Cardiology Consult/New Patient     Patient ID: Trudi Patel 73 y.o. female. 1950  PCP: Abigail Gardner,    History Present Illness     rTudi Patel returns to the office today for the first time since 2014.  Please allow me to review her history.    Елена Gomez is a 73-year-old white female whose past medical history is significant for reflux, irritable bowel, seizure disorder, osteoporosis and tremor, who returns after having a repeat CT calcium score that was elevated and being diagnosed with hyperlipidemia.  I saw her several years ago for intermittent palpitations and a stress echo at that time was negative for ischemia.  Although both of her parents had heart disease, her mother lived into her 90s as did her father, although he had a myocardial infarction at age 87.  Previously she had a \"small focus of coronary calcium\" on a chest CT.    She recently had a calcium score of 83 and was advised to start statin therapy.  She is reluctant to do so and wants to discuss this and her calcium score today.    On review of systems, she does not have any exertional chest pain.  She does feel that she gets some dyspnea with exertion, especially when she started back on her walking program with hills, but this does seem to be improving.  No PND or orthopnea.  No significant palpitations.  She recently had COVID in August but seems to have recovered from this.    She says she has been counseled to start statins, but does not want to do so because her mother had dementia later in life.  The patient says there is a genetic predisposition for this and her family and she was told \"by a nurse that statins take all the fat out of your brain and that is why they can lead to dementia\".  I told her that there is no clear relationship between dementia and statins and that cerebrovascular disease is also a common cause of dementia.    The patient inquired about taking fish oil, but I told her this would " "not do much for her lipid profile and could worsen her GI symptoms.  She wanted to know about taking co-Q10, which would not cause any harm, but will not demonstrate any significant reduction in her cholesterol.  She was curious if the use of Prolia had caused \"calcium buildup in her heart blood vessels\" but I told her this is very unlikely.  The patient asked \"why so many people around statins\" and I explained that atherosclerosis is very prevalent in the [place], as well as diabetes, so statin use is indicated in these conditions.    She states that there are some integrative practitioners in her family and they recommend avoiding statins.    All other systems reviewed and negative except as noted in HPI.  Past History     Past Medical History:   Diagnosis Date    Asthma     Benign essential tremor     Bronchitis     Persistent bronchitis following sinusitis    GERD (gastroesophageal reflux disease)     History of COVID-19 08/2023    Ileitis 03/2021    Irritable bowel syndrome     Nephrolithiasis     Osteoporosis     Patient has declined treatment for this    Seizures (CMS/[facility])     Sinusitis      Past Surgical History:   Procedure Laterality Date    APPENDECTOMY      CHOLECYSTECTOMY      ROTATOR CUFF REPAIR      SEPTOPLASTY      SHOULDER ARTHROSCOPY      TUBAL LIGATION      WRIST SURGERY Left      Social History     Tobacco Use    Smoking status: Never    Smokeless tobacco: Never   Substance Use Topics    Alcohol use: Yes     Comment: Social Drinker    Drug use: No     Family History   Problem Relation Age of Onset    Heart attack Biological Mother     Heart failure Biological Mother     Fainting Biological Mother     Diabetes Biological Mother     Hypertension Biological Mother     Hypertension Biological Father     Heart disease Biological Father         Myocardial infarction in his late 80s    Hypertension Biological Sister     Hyperlipidemia Biological Sister     Diabetes " "Biological Sister     Hyperlipidemia Biological Brother      Allergies/Medications   Allergies: Patient has no known allergies.    Current Outpatient Medications:       Vitals:    10/31/23 1302   BP: 122/74   BP Location: Left upper arm   Patient Position: Sitting   Pulse: 63   Resp: 16   Weight: 59.2 kg (130 lb 9.6 oz)   Height: 1.6 m (5' 3\")     Physical Exam:  Constitutional: Well-nourished. No apparent distress.  Eyes: No icterus.  ENT: Mucosa moist.  Neck: Supple. No jugular venous distention. No carotid bruits.  Cardiac: Normal S1 and S2, regular rate and rhythm, no S3.  No rub. No ectopy.  Lungs: Clear to auscultation bilaterally.  No wheezing.  Abdomen: Soft, nontender, positive normoactive bowel sounds.   Extremities: No clubbing or cyanosis.  No calf tenderness.  No edema.  Distal pulses intact.  Skin: Warm and dry.  No jaundice.  Musculoskeletal: No joint swelling or erythema.  Neurologic: Awake, alert, oriented.  Moving all extremities.  Psychiatric: No agitation.    Labs/ Imaging/Procedures   Labs  9/5/2023: total cholesterol 186, HDL 54, [id number], triglycerides 170, glucose 99, BUN 12, creatinine 0.71, GFR 91, sodium 141, potassium 4.0, chloride 107, CO2 30, LFTs unremarkable.     3/6/2023: sodium 141, potassium 3.8, chloride 103, CO2 27, BUN 17, creatinine 0.60, GFR 97, glucose 113, LFTs unremarkable, hemoglobin 12.6, platelets 221,000.    2/1/2023: TSH 2.17, hemoglobin A1c 5.4, total cholesterol 185, triglycerides 119, HDL 58, [id number], glucose 137, BUN 19, creatinine 0.73, GFR 88, sodium 142, potassium 3.7, chloride 106, CO2 29, LFTs unremarkable, hemoglobin 12.4, platelets 219,000.     Imaging  CT calcium score 10/5/2023: Coronary artery calcium Agatston score is 83.  Left main 0, LAD 83, LCx 0, RCA 0.  Ascending aorta measures 33 mm .Descending aorta measures 22 mm. No actionable incidental findings.    Exercise stress echo 8/16/2019: The left ventricle is normal in size.  There is normal wall " thickness.  No regional wall motion abnormalities.  Left ventricular ejection fraction is 65 to 70%.  The right and left atrium are normal in size.  The mitral valve is normal in appearance.  There is trace mitral regurgitation.  The aortic valve is trileaflet.  There is no significant aortic stenosis or insufficiency.  The right ventricle is normal in size and function.  Aortic root and ascending aorta are normal in size.  There is trace tricuspid regurgitation with an estimated PA systolic pressure 21 mmHg. Exercise tolerance was average. The EKG does not meet diagnostic criteria for ischemia. Exercise echocardiogram does not meet diagnostic criteria for ischemia    Holter monitor 1/28/2016: Good technical quality. Sinus rhythm with average HR 72 bpm. Min HR 58 bpm. Max  bpm. No significant pauses or AV block noted. Normal FL, QRS, and QT intervals at varying heart rates. Max R-R interval is 1.05 seconds. Isolated APDs noted. No complex atrial arrhythmia. Isolated VPDs noted. No complex ventricular arrhythmia. No diary submitted.    Exercise stress echo 1/22/2016: This is a normal exercise stress echocardiogram. Exercise electrocardiogram did have significant artifact which was somewhat technically limiting, but there is no evidence of ischemia or exercise-induced arrhythmia. Normal LV function at rest and hyperdynamic function with exercise.     EKG  Assessment/Plan     1. Coronary artery calcification seen on CAT scan  Patricia, her  and I had a lengthy discussion about this.  He was present for the entire visit.  Although I applaud her efforts with weight loss, diet and exercise, she has a CT calcium score of 83 and I would still recommend statin therapy.  Hopefully with all of the lifestyle changes, her LDL cholesterol will decrease, but I doubt that she will get to less than 70 mg/dL.  If her CT calcium score were 0, it may be adequate to follow her, but I agree that she should initiate statin  therapy.  We discussed the alternatives she presented and none of these will be an appropriate substitute.  I recommended that she start with rosuvastatin 5 mg once daily and see how she responds.  I believe her triglycerides will come down with better diet and her walking program-she is already lost some weight.  She is getting blood work done in early December so I provided her with a prescription to get LFTs and a lipid profile-it may be a little early to see the full effect of the statin, but she would like to start the medication now.  We had some discussion about whether she should wait until she returns from some planned vacations, but I do not see any advantage to that.    She will return for an exercise stress echocardiogram as well.  Although I think it is true her dyspnea on exertion could be related to weight gain and deconditioning, this needs to be further evaluated.  She is planning a trip to [place] and [place], so she will get this done as soon as possible.    2. Shortness of breath  Exercise stress echocardiogram planned.  Aggressive risk factor modification.  Blood pressure is well controlled and there is no evidence of volume overload.    3. Sensation of chest tightness  She gets this very rarely, but should have an ischemic evaluation as outlined.    4. Palpitations  Not as much of an issue as they were previously.  We discussed the importance of maintaining good hydration, eating throughout the day with adequate protein intake and avoiding excessive sugar and alcohol. Patient should avoid stimulating substances including caffeine, energy drinks, tobacco and over-the-counter medications such as decongestants.    5.  Elevated LDL  Her LDL is greater than 100 mg/dL and she will be following up for repeat profile shortly.  Starting rosuvastatin 5 mg once daily.  LDL goal less than 70 mg/dL.  She is not diabetic.    We also had a discussion about her vaccine hesitancy.  I told her I would  "recommend that she get the COVID-vaccine-she feels that they \"were rushed and have not been researched\".  I explained that mRNA technology was being investigated for other uses, but expedited because of the pandemic.  She states that she will get the COVID-vaccine, which I think is important with her upcoming international travel.    Return in about 1 year (around 10/31/2024).  Her  was present for entire visit.  All questions answered.  I spent 60 minutes on the date of service performing the following activities: obtaining history, performing examination, entering orders, documenting, preparing for visit, obtaining / reviewing available records, providing counseling and education and communicating results.  Previous imaging reviewed.  Thank you for allowing me to participate in the care of this patient.  I hope this information is helpful.  Please do not hesitate to contact me with any questions or concerns.    By signing my name below, I, Estee Marquez, attest that this documentation has been prepared under the direction and in the presence of Shabana Willingham MD      10/31/2023 4:26 PM        Shabana Willingham MD, [facility]  10/31/2023  "

## 2023-10-31 NOTE — LETTER
"October 31, 2023     Abigail Gardner DO  [address] [place] [id number]    Patient: Trudi Patel  YOB: 1950  Date of Visit: 10/31/2023      Dear Dr. Gardner:    Thank you for referring Trudi Patel to me for evaluation. Below are my notes for this consultation.    If you have questions, please do not hesitate to call me. I look forward to following your patient along with you.         Sincerely,        Shabana Willingham MD        CC: MD Yaya Mcnally Erin, MD  10/31/2023  4:43 PM  Signed                       Cardiology Consult/New Patient     Patient ID: Trudi Patel 73 y.o. female. 1950  PCP: Abigail Gardner DO   History Present Illness     Trudi Patel returns to the office today for the first time since 2014.  Please allow me to review her history.    Елена Gomez is a 73-year-old white female whose past medical history is significant for reflux, irritable bowel, seizure disorder, osteoporosis and tremor, who returns after having a repeat CT calcium score that was elevated and being diagnosed with hyperlipidemia.  I saw her several years ago for intermittent palpitations and a stress echo at that time was negative for ischemia.  Although both of her parents had heart disease, her mother lived into her 90s as did her father, although he had a myocardial infarction at age 87.  Previously she had a \"small focus of coronary calcium\" on a chest CT.    She recently had a calcium score of 83 and was advised to start statin therapy.  She is reluctant to do so and wants to discuss this and her calcium score today.    On review of systems, she does not have any exertional chest pain.  She does feel that she gets some dyspnea with exertion, especially when she started back on her walking program with hills, but this does seem to be improving.  No PND or orthopnea.  No significant palpitations.  She recently had COVID in August but seems to have recovered from " "this.    She says she has been counseled to start statins, but does not want to do so because her mother had dementia later in life.  The patient says there is a genetic predisposition for this and her family and she was told \"by a nurse that statins take all the fat out of your brain and that is why they can lead to dementia\".  I told her that there is no clear relationship between dementia and statins and that cerebrovascular disease is also a common cause of dementia.    The patient inquired about taking fish oil, but I told her this would not do much for her lipid profile and could worsen her GI symptoms.  She wanted to know about taking co-Q10, which would not cause any harm, but will not demonstrate any significant reduction in her cholesterol.  She was curious if the use of Prolia had caused \"calcium buildup in her heart blood vessels\" but I told her this is very unlikely.  The patient asked \"why so many people around statins\" and I explained that atherosclerosis is very prevalent in the [place], as well as diabetes, so statin use is indicated in these conditions.    She states that there are some integrative practitioners in her family and they recommend avoiding statins.    All other systems reviewed and negative except as noted in HPI.  Past History     Past Medical History:   Diagnosis Date    Asthma     Benign essential tremor     Bronchitis     Persistent bronchitis following sinusitis    GERD (gastroesophageal reflux disease)     History of COVID-19 08/2023    Ileitis 03/2021    Irritable bowel syndrome     Nephrolithiasis     Osteoporosis     Patient has declined treatment for this    Seizures (CMS/[facility])     Sinusitis      Past Surgical History:   Procedure Laterality Date    APPENDECTOMY      CHOLECYSTECTOMY      ROTATOR CUFF REPAIR      SEPTOPLASTY      SHOULDER ARTHROSCOPY      TUBAL LIGATION      WRIST SURGERY Left      Social History     Tobacco Use    Smoking status: " "Never    Smokeless tobacco: Never   Substance Use Topics    Alcohol use: Yes     Comment: Social Drinker    Drug use: No     Family History   Problem Relation Age of Onset    Heart attack Biological Mother     Heart failure Biological Mother     Fainting Biological Mother     Diabetes Biological Mother     Hypertension Biological Mother     Hypertension Biological Father     Heart disease Biological Father         Myocardial infarction in his late 80s    Hypertension Biological Sister     Hyperlipidemia Biological Sister     Diabetes Biological Sister     Hyperlipidemia Biological Brother      Allergies/Medications   Allergies: Patient has no known allergies.    Current Outpatient Medications:       Vitals:    10/31/23 1302   BP: 122/74   BP Location: Left upper arm   Patient Position: Sitting   Pulse: 63   Resp: 16   Weight: 59.2 kg (130 lb 9.6 oz)   Height: 1.6 m (5' 3\")     Physical Exam:  Constitutional: Well-nourished. No apparent distress.  Eyes: No icterus.  ENT: Mucosa moist.  Neck: Supple. No jugular venous distention. No carotid bruits.  Cardiac: Normal S1 and S2, regular rate and rhythm, no S3.  No rub. No ectopy.  Lungs: Clear to auscultation bilaterally.  No wheezing.  Abdomen: Soft, nontender, positive normoactive bowel sounds.   Extremities: No clubbing or cyanosis.  No calf tenderness.  No edema.  Distal pulses intact.  Skin: Warm and dry.  No jaundice.  Musculoskeletal: No joint swelling or erythema.  Neurologic: Awake, alert, oriented.  Moving all extremities.  Psychiatric: No agitation.    Labs/ Imaging/Procedures   Labs  9/5/2023: total cholesterol 186, HDL 54, [id number], triglycerides 170, glucose 99, BUN 12, creatinine 0.71, GFR 91, sodium 141, potassium 4.0, chloride 107, CO2 30, LFTs unremarkable.     3/6/2023: sodium 141, potassium 3.8, chloride 103, CO2 27, BUN 17, creatinine 0.60, GFR 97, glucose 113, LFTs unremarkable, hemoglobin 12.6, platelets 221,000.    2/1/2023: TSH 2.17, " hemoglobin A1c 5.4, total cholesterol 185, triglycerides 119, HDL 58, [id number], glucose 137, BUN 19, creatinine 0.73, GFR 88, sodium 142, potassium 3.7, chloride 106, CO2 29, LFTs unremarkable, hemoglobin 12.4, platelets 219,000.     Imaging  CT calcium score 10/5/2023: Coronary artery calcium Agatston score is 83.  Left main 0, LAD 83, LCx 0, RCA 0.  Ascending aorta measures 33 mm .Descending aorta measures 22 mm. No actionable incidental findings.    Exercise stress echo 8/16/2019: The left ventricle is normal in size.  There is normal wall thickness.  No regional wall motion abnormalities.  Left ventricular ejection fraction is 65 to 70%.  The right and left atrium are normal in size.  The mitral valve is normal in appearance.  There is trace mitral regurgitation.  The aortic valve is trileaflet.  There is no significant aortic stenosis or insufficiency.  The right ventricle is normal in size and function.  Aortic root and ascending aorta are normal in size.  There is trace tricuspid regurgitation with an estimated PA systolic pressure 21 mmHg. Exercise tolerance was average. The EKG does not meet diagnostic criteria for ischemia. Exercise echocardiogram does not meet diagnostic criteria for ischemia    Holter monitor 1/28/2016: Good technical quality. Sinus rhythm with average HR 72 bpm. Min HR 58 bpm. Max  bpm. No significant pauses or AV block noted. Normal NM, QRS, and QT intervals at varying heart rates. Max R-R interval is 1.05 seconds. Isolated APDs noted. No complex atrial arrhythmia. Isolated VPDs noted. No complex ventricular arrhythmia. No diary submitted.    Exercise stress echo 1/22/2016: This is a normal exercise stress echocardiogram. Exercise electrocardiogram did have significant artifact which was somewhat technically limiting, but there is no evidence of ischemia or exercise-induced arrhythmia. Normal LV function at rest and hyperdynamic function with exercise.     EKG  Assessment/Plan      1. Coronary artery calcification seen on CAT scan  Patricia, her  and I had a lengthy discussion about this.  He was present for the entire visit.  Although I applaud her efforts with weight loss, diet and exercise, she has a CT calcium score of 83 and I would still recommend statin therapy.  Hopefully with all of the lifestyle changes, her LDL cholesterol will decrease, but I doubt that she will get to less than 70 mg/dL.  If her CT calcium score were 0, it may be adequate to follow her, but I agree that she should initiate statin therapy.  We discussed the alternatives she presented and none of these will be an appropriate substitute.  I recommended that she start with rosuvastatin 5 mg once daily and see how she responds.  I believe her triglycerides will come down with better diet and her walking program-she is already lost some weight.  She is getting blood work done in early December so I provided her with a prescription to get LFTs and a lipid profile-it may be a little early to see the full effect of the statin, but she would like to start the medication now.  We had some discussion about whether she should wait until she returns from some planned vacations, but I do not see any advantage to that.    She will return for an exercise stress echocardiogram as well.  Although I think it is true her dyspnea on exertion could be related to weight gain and deconditioning, this needs to be further evaluated.  She is planning a trip to [place] and [facility], so she will get this done as soon as possible.    2. Shortness of breath  Exercise stress echocardiogram planned.  Aggressive risk factor modification.  Blood pressure is well controlled and there is no evidence of volume overload.    3. Sensation of chest tightness  She gets this very rarely, but should have an ischemic evaluation as outlined.    4. Palpitations  Not as much of an issue as they were previously.  We discussed the importance of  "maintaining good hydration, eating throughout the day with adequate protein intake and avoiding excessive sugar and alcohol. Patient should avoid stimulating substances including caffeine, energy drinks, tobacco and over-the-counter medications such as decongestants.    5.  Elevated LDL  Her LDL is greater than 100 mg/dL and she will be following up for repeat profile shortly.  Starting rosuvastatin 5 mg once daily.  LDL goal less than 70 mg/dL.  She is not diabetic.    We also had a discussion about her vaccine hesitancy.  I told her I would recommend that she get the COVID-vaccine-she feels that they \"were rushed and have not been researched\".  I explained that mRNA technology was being investigated for other uses, but expedited because of the pandemic.  She states that she will get the COVID-vaccine, which I think is important with her upcoming international travel.    Return in about 1 year (around 10/31/2024).  Her  was present for entire visit.  All questions answered.  I spent 60 minutes on the date of service performing the following activities: obtaining history, performing examination, entering orders, documenting, preparing for visit, obtaining / reviewing available records, providing counseling and education and communicating results.  Previous imaging reviewed.  Thank you for allowing me to participate in the care of this patient.  I hope this information is helpful.  Please do not hesitate to contact me with any questions or concerns.    By signing my name below, I, Estee Marquez, attest that this documentation has been prepared under the direction and in the presence of Shabana Willingham MD      10/31/2023 4:26 PM        Shabana Willingham MD, [facility], [facility]  10/31/2023    "

## 2023-11-01 ENCOUNTER — TELEPHONE (OUTPATIENT)
Dept: SCHEDULING | Facility: CLINIC | Age: 73
End: 2023-11-01
Payer: COMMERCIAL

## 2023-11-02 ENCOUNTER — TELEPHONE (OUTPATIENT)
Dept: SCHEDULING | Facility: CLINIC | Age: 73
End: 2023-11-02
Payer: COMMERCIAL

## 2023-11-02 NOTE — TELEPHONE ENCOUNTER
"Patient Name: Trudi Patel    Relationship: Self    Reason for call: Pt called stating Enma has called her 5 x and all it says is to \"Call your doctor for more information and Pt would like to know what its in regards too     Callback number: [phone]    "

## 2023-11-02 NOTE — TELEPHONE ENCOUNTER
Spoke with patient. Patient has been authorized to have a stress echo. Unsure what the phone calls from [employer] would be related to. I told patient to disregard. I let her know there is nothing we need from her on our end.

## 2023-11-07 PROBLEM — Z12.11 COLON CANCER SCREENING: Status: RESOLVED | Noted: 2023-09-08 | Resolved: 2023-11-07

## 2023-11-14 ENCOUNTER — TELEPHONE (OUTPATIENT)
Dept: SCHEDULING | Facility: CLINIC | Age: 73
End: 2023-11-14
Payer: COMMERCIAL

## 2023-11-14 NOTE — TELEPHONE ENCOUNTER
Pt of Dr. Javier, last OV 10/31/23. PMH includes coronary artery calcification, SOB, palpitations, elevated LDL.     Received call from patient stating she started rosuvastatin 5 mg daily about 2 weeks ago. Patient states she has had mild muscle aches but they have been tolerable.     Yesterday patient was experiencing constipation and she took a dulcolax suppository. She also had her COVID vaccine yesterday. Today, patient reports abdominal pain, 3-4 soft bowel movements, and nausea. States she is unsure if it is from the Crestor.     Patient also reported mild dizziness and lightheadedness this morning after having a cup of coffee. States she ate breakfast and felt better after. Pt denies chest pain or SOB.     Advised patient that symptoms are most likely related to the laxative and recent COVID vaccine. Instructed patient to increase her hydration. Patient states she had 32 oz already today, instructed to try to drink 60 oz today.     Patient states she is going to take PRN dose of Zofran and wants to hold her rosuvastatin dose tonight. Instructed patient to continue to monitor her symptoms and let us know if muscle aches worsen.     Patient can be reached at [phone].

## 2023-11-14 NOTE — TELEPHONE ENCOUNTER
I do not think that the GI symptoms are secondary to rosuvastatin.  I think it is more likely that it is combination of the Dulcolax and COVID-vaccine.  Agree with increasing hydration.

## 2023-11-14 NOTE — TELEPHONE ENCOUNTER
Pt called to reschedule stress echo at [place] tomorrow, 11/15 due to illness. Pt accepted stress echo at [facility] on 12/13.    Pt can be reached at [phone]

## 2023-11-15 NOTE — TELEPHONE ENCOUNTER
NATHAN --     Patient called back stating she is feeling much better today. She will take her Crestor with dinner tonight and notify us in the future with any symptoms.     Patient also will be starting a CoQ10 supplement, asking when the best time to take it is. Advised patient to take with a meal, either in the AM with breakfast or PM with dinner. Patient states she will take in the AM.     If needed, patient can be reached at [phone].

## 2023-12-06 ENCOUNTER — TELEPHONE (OUTPATIENT)
Dept: SCHEDULING | Facility: CLINIC | Age: 73
End: 2023-12-06
Payer: COMMERCIAL

## 2023-12-06 NOTE — TELEPHONE ENCOUNTER
"Pt of Dr. Javier, last OV 10/31/23. PMH includes coronary artery calcification, SOB, palpitations, elevated LDL.     Received call from patient reporting worsening confusion since starting rosuvastatin 5 mg daily on 11/1/23.     Patient states for the first few weeks she felt \"foggy brained,\" however now she is forgetting basic daily tasks such as putting her hearing aids in.     Reviewed other possible causes of confusion with patient including dehydration or UTI. Patient drinks about 48 oz of water daily, will try to increase to 64 oz. Patient denies any dysuria, urgency, or frequency, however does report occasional foul-smelling, cloudy urine. Advised patient to reach out to PCP regarding possible urine sample.     Patient feels well otherwise, states she is walking 2 miles a day without difficulty. She is going to lab tomorrow for hepatic function and lipid panel. She is also having CMP, CBC, and HgbA1c done (ordered by PCP).     Please call patient regarding possible adverse effect of rosuvastatin, or if you would like patient to have additional blood work.     Patient can be reached at [phone].   "

## 2023-12-06 NOTE — TELEPHONE ENCOUNTER
"Called patient back, and instructed for her to hold rosuvastatin for two weeks and call us back in 2 weeks to let us know how \"foggy brain\" feeling is.      Of note patient informed me that she started weight watchers and walking 2 miles a day and has lost 8 lbs since her last lab work, which was completed on 09/05/2023.      Patient also stated that she reached out to her PCP to get a script for a Urinalysis to make sure she does not have a UTI.    Patient verbally understands plan and will call the office back in 2 weeks to report symptoms.  "

## 2023-12-07 LAB
ALBUMIN SERPL-MCNC: 4.2 G/DL (ref 3.6–5.1)
ALBUMIN/GLOB SERPL: 1.8 (CALC) (ref 1–2.5)
ALP SERPL-CCNC: 79 U/L (ref 37–153)
ALT SERPL-CCNC: 9 U/L (ref 6–29)
AST SERPL-CCNC: 13 U/L (ref 10–35)
BILIRUB DIRECT SERPL-MCNC: 0.1 MG/DL
BILIRUB INDIRECT SERPL-MCNC: 0.3 MG/DL (CALC) (ref 0.2–1.2)
BILIRUB SERPL-MCNC: 0.4 MG/DL (ref 0.2–1.2)
CHOLEST SERPL-MCNC: 134 MG/DL
CHOLEST/HDLC SERPL: 2.1 (CALC)
GLOBULIN SER CALC-MCNC: 2.3 G/DL (CALC) (ref 1.9–3.7)
HDLC SERPL-MCNC: 63 MG/DL
LDLC SERPL CALC-MCNC: 56 MG/DL (CALC)
NONHDLC SERPL-MCNC: 71 MG/DL (CALC)
PROT SERPL-MCNC: 6.5 G/DL (ref 6.1–8.1)
TRIGL SERPL-MCNC: 72 MG/DL

## 2023-12-13 ENCOUNTER — HOSPITAL ENCOUNTER (OUTPATIENT)
Dept: CARDIOLOGY | Facility: CLINIC | Age: 73
Discharge: HOME | End: 2023-12-13
Attending: INTERNAL MEDICINE
Payer: COMMERCIAL

## 2023-12-13 VITALS
WEIGHT: 130 LBS | SYSTOLIC BLOOD PRESSURE: 120 MMHG | HEIGHT: 63 IN | DIASTOLIC BLOOD PRESSURE: 80 MMHG | BODY MASS INDEX: 23.04 KG/M2

## 2023-12-13 DIAGNOSIS — I25.10 CORONARY ARTERY CALCIFICATION SEEN ON CAT SCAN: ICD-10-CM

## 2023-12-13 DIAGNOSIS — R06.02 SHORTNESS OF BREATH: ICD-10-CM

## 2023-12-13 PROCEDURE — 93350 STRESS TTE ONLY: CPT | Performed by: STUDENT IN AN ORGANIZED HEALTH CARE EDUCATION/TRAINING PROGRAM

## 2023-12-13 PROCEDURE — 93325 DOPPLER ECHO COLOR FLOW MAPG: CPT | Performed by: STUDENT IN AN ORGANIZED HEALTH CARE EDUCATION/TRAINING PROGRAM

## 2023-12-13 PROCEDURE — 93320 DOPPLER ECHO COMPLETE: CPT | Performed by: STUDENT IN AN ORGANIZED HEALTH CARE EDUCATION/TRAINING PROGRAM

## 2023-12-14 NOTE — TELEPHONE ENCOUNTER
Please call Patricia and tell her that she should stay off the rosuvastatin until she returns.  When she returns, she should call back to discuss further therapeutic options with Dr. Javier

## 2023-12-14 NOTE — TELEPHONE ENCOUNTER
Received call from patient stating her confusion and brain fog resolved within 3 days after stopping rosuvastatin.     States she is feeling great. She has been increasing her exercise and started weight watchers. She reports a 9 pound weight loss.     Patient is going to [place] in 3 weeks and is asking to stay off rosuvastatin or take a reduced dose (only 1-2x a week instead of daily).    Please advise.     Patient can be reached at [phone].

## 2023-12-19 LAB
AORTIC ROOT ANNULUS: 3.3 CM
ASCENDING AORTA: 3.1 CM
AV PEAK GRADIENT: 5 MMHG
AV PEAK VELOCITY-S: 1.1 M/S
AV VALVE AREA: 1.86 CM2
BSA FOR ECHO PROCEDURE: 1.62 M2
E WAVE DECELERATION TIME: 165 MS
E/A RATIO: 0.7
E/E' RATIO: 7.5
E/LAT E' RATIO: 7.8
EDV (BP): 52.1 CM3
EF (A4C): 69.4 %
EF A2C: 60.4 %
EJECTION FRACTION: 65.6 %
ESV (BP): 17.9 CM3
LA ESV (BP): 35.6 CM3
LA ESV INDEX (A2C): 19.88 CM3/M2
LA ESV INDEX (BP): 21.98 CM3/M2
LA/AORTA RATIO: 0.85
LAAS-AP2: 13.5 CM2
LAAS-AP4: 14.2 CM2
LAD 2D: 2.8 CM
LALD A4C: 4.12 CM
LALD A4C: 4.48 CM
LAV-S: 32.2 CM3
LEFT ATRIUM VOLUME INDEX: 22.35 CM3/M2
LEFT ATRIUM VOLUME: 36.2 CM3
LEFT VENTRICLE DIASTOLIC VOLUME INDEX: 33.46 CM3/M2
LEFT VENTRICLE DIASTOLIC VOLUME: 54.2 CM3
LEFT VENTRICLE SYSTOLIC VOLUME INDEX: 10.25 CM3/M2
LEFT VENTRICLE SYSTOLIC VOLUME: 16.6 CM3
LV DIASTOLIC VOLUME: 47.3 CM3
LV ESV (APICAL 2 CHAMBER): 18.7 CM3
LVAD-AP2: 20.1 CM2
LVAD-AP4: 20.8 CM2
LVAS-AP2: 10.6 CM2
LVAS-AP4: 9.89 CM2
LVEDVI(A2C): 29.2 CM3/M2
LVEDVI(BP): 32.16 CM3/M2
LVESVI(A2C): 11.54 CM3/M2
LVESVI(BP): 11.05 CM3/M2
LVLD-AP2: 6.96 CM
LVLD-AP4: 6.55 CM
LVLS-AP2: 4.9 CM
LVLS-AP4: 5.11 CM
LVOT 2D: 1.9 CM
LVOT A: 2.84 CM2
LVOT PEAK VELOCITY: 0.92 M/S
LVOT PG: 3 MMHG
MLH CV ECHO AVA INDEX VELOCITY RATIO: 1.1
MV E'TISSUE VEL-LAT: 0.08 M/S
MV E'TISSUE VEL-MED: 0.08 M/S
MV PEAK A VEL: 0.83 M/S
MV PEAK E VEL: 0.62 M/S
PV PEAK GRADIENT: 3 MMHG
PV PV: 0.81 M/S
RVOT VMAX: 0.75 M/S
RVOT VTI: 14.4 CM
SEPTAL TISSUE DOPPLER FREE WALL LATE DIA VELOCITY (APICAL 4 CHAMBER VIEW): 0.13 M/S
STRESS BASELINE BP: NORMAL MMHG
STRESS BASELINE HR: 72 BPM
STRESS ECHO POST RECOVERY HR: 97 BPM
STRESS O2 SAT REST: 97 %
STRESS PERCENT HR: 85 %
STRESS POST ESTIMATED WORKLOAD: 10.1 METS
STRESS POST EXERCISE DUR MIN: 8 MIN
STRESS POST EXERCISE DUR SEC: 52 SEC
STRESS POST O2 SAT PEAK: 97 %
STRESS POST PEAK BP: NORMAL MMHG
STRESS POST PEAK HR: 126 BPM
STRESS TARGET HR: 127 BPM

## 2024-04-30 ENCOUNTER — NURSE TRIAGE (OUTPATIENT)
Age: 74
End: 2024-04-30

## 2024-04-30 NOTE — TELEPHONE ENCOUNTER
Pt calling back in, she want to know if she is okay to continue pepto and zofran until office visit. I advised if it was helping her she can continue until OV on 5/8/24.   No constipation noted

## 2024-04-30 NOTE — TELEPHONE ENCOUNTER
"Last ov 9/8/23 Dr. Campoverde  Patient requesting appointment this week with Dr. Campoverde. She has been experiencing ongoing issues with nausea, burping. She states she is \"living on Zofran and Pepto\" without relief. She continues with her usual medications. Patient scheduled for 5/8/24 at [place] office.  "

## 2024-04-30 NOTE — TELEPHONE ENCOUNTER
Regarding: nausea, burping  ----- Message from Gilda Stark sent at 4/30/2024  8:06 AM EDT -----  Pt calling because she is having nausea, burping. She states she is living on pepto and zofran. She wanted to schedule with Dr Campoverde. I advised he does not have anything until June. 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 She states Dr Campoverde told her to call they office and let them know it is her and he would get her in. She wants to be seen this week

## 2024-05-07 RX ORDER — ASPIRIN 81 MG/1
81 TABLET ORAL DAILY
COMMUNITY

## 2024-05-08 ENCOUNTER — OFFICE VISIT (OUTPATIENT)
Age: 74
End: 2024-05-08
Payer: COMMERCIAL

## 2024-05-08 VITALS
WEIGHT: 132.6 LBS | DIASTOLIC BLOOD PRESSURE: 70 MMHG | BODY MASS INDEX: 22.64 KG/M2 | HEIGHT: 64 IN | SYSTOLIC BLOOD PRESSURE: 110 MMHG

## 2024-05-08 DIAGNOSIS — Z12.11 SCREENING FOR COLON CANCER: ICD-10-CM

## 2024-05-08 DIAGNOSIS — R11.0 NAUSEA: ICD-10-CM

## 2024-05-08 DIAGNOSIS — K21.9 GASTROESOPHAGEAL REFLUX DISEASE WITHOUT ESOPHAGITIS: Primary | ICD-10-CM

## 2024-05-08 DIAGNOSIS — Z80.0 FH: COLON CANCER: ICD-10-CM

## 2024-05-08 PROCEDURE — G2211 COMPLEX E/M VISIT ADD ON: HCPCS | Performed by: INTERNAL MEDICINE

## 2024-05-08 PROCEDURE — 99214 OFFICE O/P EST MOD 30 MIN: CPT | Performed by: INTERNAL MEDICINE

## 2024-05-08 RX ORDER — RABEPRAZOLE SODIUM 20 MG/1
20 TABLET, DELAYED RELEASE ORAL 2 TIMES DAILY
Qty: 60 TABLET | Refills: 3 | Status: SHIPPED | OUTPATIENT
Start: 2024-05-08 | End: 2024-09-05

## 2024-05-08 NOTE — H&P (VIEW-ONLY)
[facility] - Outpatient Follow-up Note  Dev Brasher 73 y.o. female MRN: [id number]  Encounter: [id number]    ASSESSMENT AND PLAN:      1. Gastroesophageal reflux disease without esophagitis  --Patient with a recent escalation and aggravation of her reflux symptoms.  Associated with nausea and uncontrollable belching.  Only change in her diet was having cappuccino over the last several months.  Has been on pantoprazole for years.  Rabeprazole is actually stronger.  Will discontinue pantoprazole and prescribe rabeprazole.    - RABEprazole (ACIPHEX) 20 MG tablet; Take 1 tablet (20 mg total) by mouth 2 (two) times a day  Dispense: 60 tablet; Refill: 3  -Can take famotidine 40 mg at bedtime as needed.  May be over the next several days do this on a steady basis    -If symptoms continue could have a lower threshold for doing repeat endoscopy.  No Odonnell's esophagus noted on previous procedures 2020    2. Nausea  -Patient had been taking as needed Zofran.  Seems to be better at this time    3. Screening for colon cancer  --Patient up-to-date with screening.  Last examination 2020.  She did not have any polyps at that time    4. FH: colon cancer  --Father with a history of colon cancer diagnosed at a young age.-In his 50s      Followup Appointment 6 mo   ______________________________________________________________________    Chief Complaint   Patient presents with    Nausea     Pt had nausea and burping for 4 days last week, symptoms have resolved. Pt noted she is having regular bowel movements with medications.     HPI  Patient is seen on a semiurgent basis for reevaluation of GERD.  For some reason patient had escalating symptoms over chronic reflux with increasing belching and intractable nausea.  Patient has been drinking cappuccino over the last several months and is not sure if this kicked off her problem.  Pantoprazole twice a day for many years.  Most recent endoscopy  was in 2020.  He did not have esophagitis at that time and no history of Odonnell's esophagus.  Patient denies any trouble swallowing.  Things seem to have calmed down as of late but the patient does report she has chronic symptoms of reflux.  She has been taking nausea fairly frequently last week as well.  Patient does have some chronic problems with right-sided abdominal pain IBS.  Generally does well with peppermint oil.  She has not had to take extra peppermint oil as of late.  Patient is up-to-date with respect to colon cancer screening.  Last colonoscopy was in 2020.  She did not have any adenomatous polyps at that time.  Her father was diagnosed with colon cancer in his 50s.  Should be due again for colonoscopy next year      Historical Information   Past Medical History:   Diagnosis Date    ADD (attention deficit disorder)     Colon polyp 1988    GERD (gastroesophageal reflux disease) 2004    Hearing loss     Irritable bowel syndrome 1959    Osteoporosis     Postnasal drip     Seizure disorder (HCC)      Past Surgical History:   Procedure Laterality Date    APPENDECTOMY  1959    CHOLECYSTECTOMY  1985    COLONOSCOPY  2020    TUBAL LIGATION  1988    UPPER GASTROINTESTINAL ENDOSCOPY  2020     Social History     Substance and Sexual Activity   Alcohol Use Yes    Alcohol/week: 1.0 standard drink of alcohol    Types: 1 Glasses of wine per week     Social History     Substance and Sexual Activity   Drug Use Never     Social History     Tobacco Use   Smoking Status Never    Passive exposure: Never   Smokeless Tobacco Never     Family History   Problem Relation Age of Onset    Depression Mother     Heart disease Mother     Hyperlipidemia Mother     Hypertension Mother     Irritable bowel syndrome Mother     Arthritis Father     Cancer Father     Colon polyps Father     Heart disease Father     Hyperlipidemia Father     Hypertension Father     Hyperlipidemia Sister     Hypertension Sister     Miscarriages / Stillbirths  "Sister     Hyperlipidemia Brother          Current Outpatient Medications:     aspirin (ECOTRIN LOW STRENGTH) 81 mg EC tablet    denosumab (PROLIA) 60 mg/mL    montelukast (SINGULAIR) 10 mg tablet    ondansetron (ZOFRAN-ODT) 4 mg disintegrating tablet    PEPPERMINT OIL PO    RABEprazole (ACIPHEX) 20 MG tablet    cetirizine (ZyrTEC) 5 MG tablet    CHOLECALCIFEROL PO    ergocalciferol (ERGOCALCIFEROL) 1.25 MG (49051 UT) capsule    famotidine (PEPCID) 40 MG tablet    fluticasone (FLONASE) 50 mcg/act nasal spray    Melatonin 10 MG TABS    Oxymetazoline HCl (Nasal Spray) 0.05 % SOLN    PHENobarbital 60 mg tablet    Polyethylene Glycol 3350 POWD    rifaximin (XIFAXAN) 550 mg tablet    Wheat Dextrin (BENEFIBER PO)  Allergies   Allergen Reactions    Pollen Extract Allergic Rhinitis and Other (See Comments)     Reviewed medications and allergies and updated as indicated    PHYSICAL EXAM:    Blood pressure 110/70, height 5' 3.5\" (1.613 m), weight 60.1 kg (132 lb 9.6 oz). Body mass index is 23.12 kg/m².  General Appearance: NAD, cooperative, alert-thin  Eyes: Anicteric, conjunctiva pink  ENT:  Normocephalic, atraumatic, normal mucosa.    Neck:  Supple, symmetrical, trachea midline  Resp:  Clear to auscultation bilaterally; no rales, rhonchi or wheezing; respirations unlabored   CV:  S1 S2, Regular rate and rhythm; no murmur, rub, or gallop.  GI:  Soft, non-tender, non-distended; normal bowel sounds; no masses, no organomegaly   Rectal: Deferred  Musculoskeletal: No cyanosis, clubbing or edema. Normal ROM.  Skin:  No jaundice, rashes, or lesions   Heme/Lymph: No palpable cervical lymphadenopathy  Psych: Normal affect, good eye contact  Neuro: No gross deficits, AAOx3        "

## 2024-05-08 NOTE — PROGRESS NOTES
[facility] - Outpatient Follow-up Note  Dev Brasher 73 y.o. female MRN: [id number]  Encounter: [id number]    ASSESSMENT AND PLAN:      1. Gastroesophageal reflux disease without esophagitis  --Patient with a recent escalation and aggravation of her reflux symptoms.  Associated with nausea and uncontrollable belching.  Only change in her diet was having cappuccino over the last several months.  Has been on pantoprazole for years.  Rabeprazole is actually stronger.  Will discontinue pantoprazole and prescribe rabeprazole.    - RABEprazole (ACIPHEX) 20 MG tablet; Take 1 tablet (20 mg total) by mouth 2 (two) times a day  Dispense: 60 tablet; Refill: 3  -Can take famotidine 40 mg at bedtime as needed.  May be over the next several days do this on a steady basis    -If symptoms continue could have a lower threshold for doing repeat endoscopy.  No Odonnell's esophagus noted on previous procedures 2020    2. Nausea  -Patient had been taking as needed Zofran.  Seems to be better at this time    3. Screening for colon cancer  --Patient up-to-date with screening.  Last examination 2020.  She did not have any polyps at that time    4. FH: colon cancer  --Father with a history of colon cancer diagnosed at a young age.-In his 50s      Followup Appointment 6 mo   ______________________________________________________________________    Chief Complaint   Patient presents with    Nausea     Pt had nausea and burping for 4 days last week, symptoms have resolved. Pt noted she is having regular bowel movements with medications.     HPI  Patient is seen on a semiurgent basis for reevaluation of GERD.  For some reason patient had escalating symptoms over chronic reflux with increasing belching and intractable nausea.  Patient has been drinking cappuccino over the last several months and is not sure if this kicked off her problem.  Pantoprazole twice a day for many years.  Most recent endoscopy  was in 2020.  He did not have esophagitis at that time and no history of Odonnell's esophagus.  Patient denies any trouble swallowing.  Things seem to have calmed down as of late but the patient does report she has chronic symptoms of reflux.  She has been taking nausea fairly frequently last week as well.  Patient does have some chronic problems with right-sided abdominal pain IBS.  Generally does well with peppermint oil.  She has not had to take extra peppermint oil as of late.  Patient is up-to-date with respect to colon cancer screening.  Last colonoscopy was in 2020.  She did not have any adenomatous polyps at that time.  Her father was diagnosed with colon cancer in his 50s.  Should be due again for colonoscopy next year      Historical Information   Past Medical History:   Diagnosis Date    ADD (attention deficit disorder)     Colon polyp 1988    GERD (gastroesophageal reflux disease) 2004    Hearing loss     Irritable bowel syndrome 1959    Osteoporosis     Postnasal drip     Seizure disorder (HCC)      Past Surgical History:   Procedure Laterality Date    APPENDECTOMY  1959    CHOLECYSTECTOMY  1985    COLONOSCOPY  2020    TUBAL LIGATION  1988    UPPER GASTROINTESTINAL ENDOSCOPY  2020     Social History     Substance and Sexual Activity   Alcohol Use Yes    Alcohol/week: 1.0 standard drink of alcohol    Types: 1 Glasses of wine per week     Social History     Substance and Sexual Activity   Drug Use Never     Social History     Tobacco Use   Smoking Status Never    Passive exposure: Never   Smokeless Tobacco Never     Family History   Problem Relation Age of Onset    Depression Mother     Heart disease Mother     Hyperlipidemia Mother     Hypertension Mother     Irritable bowel syndrome Mother     Arthritis Father     Cancer Father     Colon polyps Father     Heart disease Father     Hyperlipidemia Father     Hypertension Father     Hyperlipidemia Sister     Hypertension Sister     Miscarriages / Stillbirths  "Sister     Hyperlipidemia Brother          Current Outpatient Medications:     aspirin (ECOTRIN LOW STRENGTH) 81 mg EC tablet    denosumab (PROLIA) 60 mg/mL    montelukast (SINGULAIR) 10 mg tablet    ondansetron (ZOFRAN-ODT) 4 mg disintegrating tablet    PEPPERMINT OIL PO    RABEprazole (ACIPHEX) 20 MG tablet    cetirizine (ZyrTEC) 5 MG tablet    CHOLECALCIFEROL PO    ergocalciferol (ERGOCALCIFEROL) 1.25 MG (29569 UT) capsule    famotidine (PEPCID) 40 MG tablet    fluticasone (FLONASE) 50 mcg/act nasal spray    Melatonin 10 MG TABS    Oxymetazoline HCl (Nasal Spray) 0.05 % SOLN    PHENobarbital 60 mg tablet    Polyethylene Glycol 3350 POWD    rifaximin (XIFAXAN) 550 mg tablet    Wheat Dextrin (BENEFIBER PO)  Allergies   Allergen Reactions    Pollen Extract Allergic Rhinitis and Other (See Comments)     Reviewed medications and allergies and updated as indicated    PHYSICAL EXAM:    Blood pressure 110/70, height 5' 3.5\" (1.613 m), weight 60.1 kg (132 lb 9.6 oz). Body mass index is 23.12 kg/m².  General Appearance: NAD, cooperative, alert-thin  Eyes: Anicteric, conjunctiva pink  ENT:  Normocephalic, atraumatic, normal mucosa.    Neck:  Supple, symmetrical, trachea midline  Resp:  Clear to auscultation bilaterally; no rales, rhonchi or wheezing; respirations unlabored   CV:  S1 S2, Regular rate and rhythm; no murmur, rub, or gallop.  GI:  Soft, non-tender, non-distended; normal bowel sounds; no masses, no organomegaly   Rectal: Deferred  Musculoskeletal: No cyanosis, clubbing or edema. Normal ROM.  Skin:  No jaundice, rashes, or lesions   Heme/Lymph: No palpable cervical lymphadenopathy  Psych: Normal affect, good eye contact  Neuro: No gross deficits, AAOx3        "

## 2024-05-08 NOTE — PATIENT INSTRUCTIONS
[facility] - Outpatient Follow-up Note  Dev Brasher 73 y.o. female MRN: [id number]  Encounter: [id number]    ASSESSMENT AND PLAN:      1. Gastroesophageal reflux disease without esophagitis  --Patient with a recent escalation and aggravation of her reflux symptoms.  Associated with nausea and uncontrollable belching.  Only change in her diet was having cappuccino over the last several months.  Has been on pantoprazole for years.  Rabeprazole is actually stronger.  Will discontinue pantoprazole and prescribe rabeprazole.    - RABEprazole (ACIPHEX) 20 MG tablet; Take 1 tablet (20 mg total) by mouth 2 (two) times a day  Dispense: 60 tablet; Refill: 3  -Can take famotidine 40 mg at bedtime as needed.  May be over the next several days do this on a steady basis    -If symptoms continue could have a lower threshold for doing repeat endoscopy.  No Odonnell's esophagus noted on previous procedures 2020    2. Nausea  -Patient had been taking as needed Zofran.  Seems to be better at this time    3. Screening for colon cancer  --Patient up-to-date with screening.  Last examination 2020.  She did not have any polyps at that time    4. FH: colon cancer  --Father with a history of colon cancer diagnosed at a young age.-In his 50s      Followup Appointment 6 mo

## 2024-05-09 DIAGNOSIS — K21.9 GASTROESOPHAGEAL REFLUX DISEASE WITHOUT ESOPHAGITIS: Primary | ICD-10-CM

## 2024-05-09 RX ORDER — FAMOTIDINE 40 MG/1
40 TABLET, FILM COATED ORAL
Qty: 90 TABLET | Refills: 1 | Status: SHIPPED | OUTPATIENT
Start: 2024-05-09

## 2024-05-09 NOTE — TELEPHONE ENCOUNTER
Last office visit yesterday Dr. Campoverde, notes can take famotidine 40 mg at bedtime as needed. May be over the next few days to do this on steady basis.    Patient requesting new Rx previous . Please sign off.

## 2024-05-09 NOTE — TELEPHONE ENCOUNTER
Patient states that the famotidine 40 mg that she had at home has . She is requesting a new script be sent in to [facility] on file

## 2024-05-16 ENCOUNTER — TELEPHONE (OUTPATIENT)
Dept: GASTROENTEROLOGY | Facility: CLINIC | Age: 74
End: 2024-05-16

## 2024-05-16 ENCOUNTER — PREP FOR PROCEDURE (OUTPATIENT)
Dept: GASTROENTEROLOGY | Facility: CLINIC | Age: 74
End: 2024-05-16

## 2024-05-16 ENCOUNTER — NURSE TRIAGE (OUTPATIENT)
Age: 74
End: 2024-05-16

## 2024-05-16 DIAGNOSIS — K21.9 GASTROESOPHAGEAL REFLUX DISEASE WITHOUT ESOPHAGITIS: Primary | ICD-10-CM

## 2024-05-16 DIAGNOSIS — R11.0 NAUSEA: ICD-10-CM

## 2024-05-16 NOTE — TELEPHONE ENCOUNTER
"LOV 05/08/24, CT abd/pelv w wo con 03/22/22      Pt with history of IBS-D GERD, nausea reports persistent, \"sour stomach\" and uncontrollable belching despite using Aciphex 20 mg BID x 1 week. Pt reports feeling a \"twinge\" sensation RUQ. Pt wishes to move forward with endoscopy discussed at last OV 05/08.    Pt requests call back with provider's advice.    Reason for Disposition   Nursing judgment    Answer Assessment - Initial Assessment Questions  1. REASON FOR CALL or QUESTION: \"What is your reason for calling today?\" or \"How can I best help you?\" or \"What question do you have that I can help answer?\"      Please see note    Protocols used: Information Only Call - No Triage-ADULT-[place]    "

## 2024-05-16 NOTE — TELEPHONE ENCOUNTER
Scheduled date of EGD(as of today):05/27/2024  Physician performing EGD:dr [email or web address]  Location of EGD:[facility]  Instructions reviewed with patient by:katty  Clearances: n    Egd instructions sent via email

## 2024-05-22 ENCOUNTER — ANESTHESIA EVENT (OUTPATIENT)
Dept: ANESTHESIOLOGY | Facility: AMBULATORY SURGERY CENTER | Age: 74
End: 2024-05-22

## 2024-05-22 ENCOUNTER — ANESTHESIA (OUTPATIENT)
Dept: ANESTHESIOLOGY | Facility: AMBULATORY SURGERY CENTER | Age: 74
End: 2024-05-22

## 2024-05-24 ENCOUNTER — TELEPHONE (OUTPATIENT)
Dept: GASTROENTEROLOGY | Facility: AMBULATORY SURGERY CENTER | Age: 74
End: 2024-05-24

## 2024-05-29 ENCOUNTER — ANESTHESIA EVENT (OUTPATIENT)
Dept: GASTROENTEROLOGY | Facility: AMBULATORY SURGERY CENTER | Age: 74
End: 2024-05-29

## 2024-05-29 ENCOUNTER — ANESTHESIA (OUTPATIENT)
Dept: GASTROENTEROLOGY | Facility: AMBULATORY SURGERY CENTER | Age: 74
End: 2024-05-29

## 2024-05-29 ENCOUNTER — HOSPITAL ENCOUNTER (OUTPATIENT)
Dept: GASTROENTEROLOGY | Facility: AMBULATORY SURGERY CENTER | Age: 74
Discharge: HOME | End: 2024-05-29
Payer: COMMERCIAL

## 2024-05-29 VITALS
OXYGEN SATURATION: 100 % | TEMPERATURE: 97.4 F | HEART RATE: 68 BPM | HEIGHT: 63 IN | RESPIRATION RATE: 14 BRPM | BODY MASS INDEX: 23.04 KG/M2 | DIASTOLIC BLOOD PRESSURE: 78 MMHG | WEIGHT: 130 LBS | SYSTOLIC BLOOD PRESSURE: 130 MMHG

## 2024-05-29 DIAGNOSIS — R11.0 NAUSEA: ICD-10-CM

## 2024-05-29 DIAGNOSIS — K21.9 GASTROESOPHAGEAL REFLUX DISEASE WITHOUT ESOPHAGITIS: ICD-10-CM

## 2024-05-29 PROCEDURE — 43239 EGD BIOPSY SINGLE/MULTIPLE: CPT | Performed by: INTERNAL MEDICINE

## 2024-05-29 PROCEDURE — 88305 TISSUE EXAM BY PATHOLOGIST: CPT | Performed by: PATHOLOGY

## 2024-05-29 RX ORDER — SODIUM CHLORIDE, SODIUM LACTATE, POTASSIUM CHLORIDE, CALCIUM CHLORIDE 600; 310; 30; 20 MG/100ML; MG/100ML; MG/100ML; MG/100ML
50 INJECTION, SOLUTION INTRAVENOUS CONTINUOUS
Status: DISCONTINUED | OUTPATIENT
Start: 2024-05-29 | End: 2024-06-02 | Stop reason: HOSPADM

## 2024-05-29 RX ORDER — PROPOFOL 10 MG/ML
INJECTION, EMULSION INTRAVENOUS AS NEEDED
Status: DISCONTINUED | OUTPATIENT
Start: 2024-05-29 | End: 2024-05-29

## 2024-05-29 RX ORDER — LIDOCAINE HYDROCHLORIDE 10 MG/ML
INJECTION, SOLUTION EPIDURAL; INFILTRATION; INTRACAUDAL; PERINEURAL AS NEEDED
Status: DISCONTINUED | OUTPATIENT
Start: 2024-05-29 | End: 2024-05-29

## 2024-05-29 RX ORDER — CHLORPHENIRAMINE MALEATE 4 MG/1
4 TABLET ORAL EVERY 6 HOURS PRN
COMMUNITY

## 2024-05-29 RX ADMIN — PROPOFOL 30 MG: 10 INJECTION, EMULSION INTRAVENOUS at 08:22

## 2024-05-29 RX ADMIN — LIDOCAINE HYDROCHLORIDE 50 MG: 10 INJECTION, SOLUTION EPIDURAL; INFILTRATION; INTRACAUDAL; PERINEURAL at 08:20

## 2024-05-29 RX ADMIN — PROPOFOL 150 MG: 10 INJECTION, EMULSION INTRAVENOUS at 08:20

## 2024-05-29 RX ADMIN — SODIUM CHLORIDE, SODIUM LACTATE, POTASSIUM CHLORIDE, CALCIUM CHLORIDE 50 ML/HR: 600; 310; 30; 20 INJECTION, SOLUTION INTRAVENOUS at 07:53

## 2024-05-29 NOTE — ANESTHESIA PREPROCEDURE EVALUATION
Procedure:  EGD    Relevant Problems   GI/HEPATIC   (+) Gastroesophageal reflux disease without esophagitis      FEN/Gastrointestinal   (+) Irritable bowel syndrome with diarrhea      Other   (+) History of seizure disorder      ADD  Last seizure about 20 years ago      Physical Exam    Airway    Mallampati score: II  TM Distance: >3 FB  Neck ROM: full     Dental   Comment: None loose, No notable dental hx     Cardiovascular      Pulmonary      Other Findings  post-pubertal.      Anesthesia Plan  ASA Score- 2     Anesthesia Type- IV sedation with anesthesia with ASA Monitors.         Additional Monitors:     Airway Plan:     Comment: NPO after MN    Patient educated on the possibility for awareness under sedation and of the possibility of airway intervention in the event of an airway or procedural emergency      .       Plan Factors-Exercise tolerance (METS): >4 METS.    Chart reviewed.    Patient summary reviewed.    Patient is not a current smoker.              Induction- intravenous.    Postoperative Plan-         Informed Consent- Anesthetic plan and risks discussed with patient.  I personally reviewed this patient with the CRNA. Discussed and agreed on the Anesthesia Plan with the CRNA..

## 2024-05-29 NOTE — ANESTHESIA POSTPROCEDURE EVALUATION
Post-Op Assessment Note    CV Status:  Stable  Pain Score: 0    Pain management: adequate       Mental Status:  Arousable and sleepy   Hydration Status:  Euvolemic   PONV Controlled:  None   Airway Patency:  Patent     Post Op Vitals Reviewed: Yes    No anethesia notable event occurred.    Staff: Anesthesiologist, CRNA   Comments: report given to RN; VSS; RUBINA          /76 (05/29/24 0827)    Temp      Pulse 70 (05/29/24 0827)   Resp 17 (05/29/24 0827)    SpO2 97 % (05/29/24 0827)

## 2024-06-03 PROCEDURE — 88305 TISSUE EXAM BY PATHOLOGIST: CPT | Performed by: PATHOLOGY

## 2024-07-22 ENCOUNTER — TELEPHONE (OUTPATIENT)
Dept: GASTROENTEROLOGY | Facility: CLINIC | Age: 74
End: 2024-07-22

## 2024-07-23 NOTE — TELEPHONE ENCOUNTER
Please send patient a banding pamphlet and set up appointment with me or Dr Whitney-thanks    GRETCHEN

## 2024-07-24 ENCOUNTER — OFFICE VISIT (OUTPATIENT)
Dept: GASTROENTEROLOGY | Facility: CLINIC | Age: 74
End: 2024-07-24
Payer: COMMERCIAL

## 2024-07-24 VITALS
BODY MASS INDEX: 23.04 KG/M2 | HEIGHT: 63 IN | DIASTOLIC BLOOD PRESSURE: 70 MMHG | WEIGHT: 130 LBS | SYSTOLIC BLOOD PRESSURE: 120 MMHG

## 2024-07-24 DIAGNOSIS — K64.8 PROLAPSED INTERNAL HEMORRHOIDS: ICD-10-CM

## 2024-07-24 DIAGNOSIS — Z86.010 PERSONAL HISTORY OF COLONIC POLYPS: ICD-10-CM

## 2024-07-24 DIAGNOSIS — K64.4 ANAL SKIN TAG: Primary | ICD-10-CM

## 2024-07-24 DIAGNOSIS — Z80.0 FAMILY HISTORY OF COLON CANCER: ICD-10-CM

## 2024-07-24 PROCEDURE — 99214 OFFICE O/P EST MOD 30 MIN: CPT | Performed by: INTERNAL MEDICINE

## 2024-07-24 NOTE — PROGRESS NOTES
"Replaced by [facility] - Outpatient Follow-up Note  Dev Brasher 74 y.o. female MRN: [id number]  Encounter: [id number]    ASSESSMENT AND PLAN:      1. Anal skin tag  -Patient with anal discomfort particularly after walking.  -Physical examination does reveal an external tag.  This is probably contributing to the patient's discomfort along with some prolapsing hemorrhoids.  -Advise Triple Paste - apply as need     2. Prolapsed internal hemorrhoids  --Noticed more prominently when patient Valsalva's.  Patient is interested in having the banding procedure but request IV sedation.  -Patient did have some traumatic childhood experiences with respect to frequent enemas.  She is very sensitive in the area    - Ambulatory Referral to Colorectal Surgery; Future  Advised that the patient see Dr. Hollis-she specializes in rectal issues.  Probably will be able to assess more complete options for the patient  - advised sitz bath warm bath as needed  -She could use her Proctofoam as needed but not for prolonged periods    3. Family history of colon cancer  --Father with a history of colon cancer age 50.  Last colonoscopy for the patient was 2020.  She is due again in 2025  Recall colon for March 2025    4. Personal history of colonic polyps  --Polyps on last exam    Followup Appointment: PRN  ______________________________________________________________________    Chief Complaint   Patient presents with    Hemorrhoids     HPI: Patient returns to the office for evaluation of anal discomfort.  Patient reports that her hemorrhoids have been \"acting up\" as of late.  She has been walking more and notices that after he walks a couple miles has some significant irritation and itching around the rectal area.  She describes these as \"the grapes of breath\".  Patient has been doing some exercising more and is not sure if this is precipitated the problem.  She reports having normal soft bowel movements " daily.  She has to push a little bit but she states that this is not excessive.  She actually uses a squatty potty to help assist with the bowel movements.  She does have a family history of colon cancer her father having had colon cancer at age 50.  She reports as a child her parents were obsessed with bowel movements and whenever she would have a fever or general health issues she was given soapsuds enemas.  There is no history of sexual abuse.  Has been using some Proctofoam with good success.    Historical Information   Past Medical History:   Diagnosis Date    ADD (attention deficit disorder)     Colon polyp 1988    GERD (gastroesophageal reflux disease) 2004    Hearing loss     Irritable bowel syndrome 1959    Osteoporosis     Postnasal drip     Seizure disorder (HCC)      Past Surgical History:   Procedure Laterality Date    APPENDECTOMY  1959    CHOLECYSTECTOMY  1985    COLONOSCOPY  2020    SHOULDER SURGERY Left     TUBAL LIGATION  1988    UPPER GASTROINTESTINAL ENDOSCOPY  2020     Social History     Substance and Sexual Activity   Alcohol Use Yes    Alcohol/week: 1.0 standard drink of alcohol    Types: 1 Glasses of wine per week    Comment: social     Social History     Substance and Sexual Activity   Drug Use Never     Social History     Tobacco Use   Smoking Status Never    Passive exposure: Never   Smokeless Tobacco Never     Family History   Problem Relation Age of Onset    Depression Mother     Heart disease Mother     Hyperlipidemia Mother     Hypertension Mother     Irritable bowel syndrome Mother     Arthritis Father     Cancer Father     Colon polyps Father     Heart disease Father     Hyperlipidemia Father     Hypertension Father     Hyperlipidemia Sister     Hypertension Sister     Miscarriages / Stillbirths Sister     Hyperlipidemia Brother          Current Outpatient Medications:     aspirin (ECOTRIN LOW STRENGTH) 81 mg EC tablet    chlorpheniramine (CHLOR-TRIMETON) 4 MG tablet    denosumab  "(PROLIA) 60 mg/mL    Melatonin 10 MG TABS    ondansetron (ZOFRAN-ODT) 4 mg disintegrating tablet    PEPPERMINT OIL PO    RABEprazole (ACIPHEX) 20 MG tablet    ergocalciferol (ERGOCALCIFEROL) 1.25 MG (95123 UT) capsule    montelukast (SINGULAIR) 10 mg tablet    PHENobarbital 60 mg tablet    Polyethylene Glycol 3350 POWD  Allergies   Allergen Reactions    Pollen Extract Allergic Rhinitis and Other (See Comments)     Reviewed medications and allergies and updated as indicated    PHYSICAL EXAM:    Blood pressure 120/70, height 5' 3\" (1.6 m), weight 59 kg (130 lb). Body mass index is 23.03 kg/m².  General Appearance: NAD, cooperative, alert-tearful during examination  Eyes: Anicteric, conjunctiva pink  ENT:  Normocephalic, atraumatic, normal mucosa.    Neck:  Supple, symmetrical, trachea midline  Resp:  Clear to auscultation bilaterally; no rales, rhonchi or wheezing; respirations unlabored   CV:  S1 S2, Regular rate and rhythm; no murmur, rub, or gallop.  GI:  Soft, non-tender, non-distended; normal bowel sounds; no masses, no organomegaly   Rectal: Positive for an external tag no mass or fissure, on Valsalva rectal mucosa seems to prolapse and is reducible  Musculoskeletal: No cyanosis, clubbing or edema. Normal ROM.  Skin:  No jaundice, rashes, or lesions   Heme/Lymph: No palpable cervical lymphadenopathy  Psych: Normal affect, good eye contact  Neuro: No gross deficits, AAOx3      "

## 2024-07-24 NOTE — PATIENT INSTRUCTIONS
Formerly [facility], later [facility] - Outpatient Follow-up Note  Dev Brasher 74 y.o. female MRN: [id number]  Encounter: [id number]    ASSESSMENT AND PLAN:      1. Anal skin tag  -Patient with anal discomfort particularly after walking.  -Physical examination does reveal an external tag.  This is probably contributing to the patient's discomfort along with some prolapsing hemorrhoids.  -Advise Triple Paste - apply as need     2. Prolapsed internal hemorrhoids  --Noticed more prominently when patient Valsalva's.  Patient is interested in having the banding procedure but request IV sedation.  -Patient did have some traumatic childhood experiences with respect to frequent enemas.  She is very sensitive in the area    - Ambulatory Referral to Colorectal Surgery; Future  Advised that the patient see Dr. Hollis-she specializes in rectal issues.  Probably will be able to assess more complete options for the patient  -- advised sitz bath warm bath as needed  -She could use her Proctofoam as needed but not for prolonged period    3. Family history of colon cancer  --Father with a history of colon cancer age 50.  Last colonoscopy for the patient was 2020.  She is due again in 2025- March 2025    4. Personal history of colonic polyps  --Polyps on last exam    Followup Appointment: PRN

## 2024-08-16 ENCOUNTER — TELEPHONE (OUTPATIENT)
Age: 74
End: 2024-08-16

## 2024-08-16 NOTE — TELEPHONE ENCOUNTER
Patients GI provider:  Dr. Campoverde    Number to return call: [phone]    Reason for call: Pt calling stating [facility] sent over a request to fill rabeprazole with them instead of [facility] due to it being cheaper for patient.     Scheduled procedure/appointment date if applicable: Apt 11/20/24

## 2024-08-20 ENCOUNTER — TELEPHONE (OUTPATIENT)
Dept: GASTROENTEROLOGY | Facility: CLINIC | Age: 74
End: 2024-08-20

## 2024-08-20 DIAGNOSIS — K21.9 GASTROESOPHAGEAL REFLUX DISEASE WITHOUT ESOPHAGITIS: Primary | ICD-10-CM

## 2024-08-20 RX ORDER — RABEPRAZOLE SODIUM 20 MG/1
20 TABLET, DELAYED RELEASE ORAL DAILY
Qty: 180 TABLET | Refills: 3 | Status: SHIPPED | OUTPATIENT
Start: 2024-08-20 | End: 2026-08-10

## 2024-08-20 RX ORDER — RABEPRAZOLE SODIUM 20 MG/1
20 TABLET, DELAYED RELEASE ORAL 2 TIMES DAILY
Qty: 180 TABLET | Refills: 2 | Status: SHIPPED | OUTPATIENT
Start: 2024-08-20 | End: 2025-05-17

## 2024-10-11 DIAGNOSIS — E78.00 ELEVATED LDL CHOLESTEROL LEVEL: Primary | ICD-10-CM

## 2024-10-11 RX ORDER — ROSUVASTATIN CALCIUM 5 MG/1
5 TABLET, COATED ORAL DAILY
Qty: 90 TABLET | Refills: 3 | Status: SHIPPED | OUTPATIENT
Start: 2024-10-11 | End: 2024-11-01

## 2024-10-15 ENCOUNTER — TELEPHONE (OUTPATIENT)
Dept: GASTROENTEROLOGY | Facility: CLINIC | Age: 74
End: 2024-10-15

## 2024-10-15 DIAGNOSIS — R11.0 NAUSEA: Primary | ICD-10-CM

## 2024-10-15 RX ORDER — ONDANSETRON 4 MG/1
4 TABLET, FILM COATED ORAL EVERY 8 HOURS PRN
Qty: 30 TABLET | Refills: 1 | Status: SHIPPED | OUTPATIENT
Start: 2024-10-15 | End: 2024-11-14

## 2024-10-15 NOTE — TELEPHONE ENCOUNTER
Patient requested Zofran prescription.  Refilled 30 pills 1 refill as needed nausea 4 mg sublingual

## 2024-10-30 NOTE — PROGRESS NOTES
"     Cardiology Office Visit     Patient ID: Trudi Patel 74 y.o. female 1950  PCP: Alex Vargas DO   History Present Illness     Trudi Patel returns to the office today accompanied by her .  As you recall she is a 74-year-old white female with a history of reflux, irritable bowel, seizure disorder, osteoporosis and tremors.  More recently she has been diagnosed with hyperlipidemia and a CT calcium score in October 2023 was 83, all of which was located in the left anterior descending.    She has had issues with lipid therapy, but is now on rosuvastatin 5 times a week and tolerating this.  Recently she was in [place] and did quite a bit of walking with no anginal symptoms.  Her last LDL approximately a year ago was 56 mg/dL.    Allergies/Medications   Allergies:Patient has no known allergies.    Medications:    aspirin, Take 81 mg by mouth daily.    cholecalciferol (vitamin D3), Take 25 mcg by mouth 2 (two) times a day.    denosumab, Inject 60 mg under the skin.    docusate sodium, Take 200 mg by mouth daily.    MAGNESIUM ORAL, Take 2 tablets by mouth nightly.    montelukast, Take 1 tablet (10 mg total) by mouth daily.    ondansetron ODT, DISSOLVE 1 TABLET ON TONGUE EVERY 8 HOURS AS NEEDED FOR NAUSEA AND VOMIT    PHENobarbitaL, 1 tablet (60 mg total) every evening.    rabeprazole sodium (ACIPHEX ORAL), Take 20 mg by mouth 2 (two) times a day.    rosuvastatin, Take 1 tablet (5 mg total) by mouth 5 (five) times a week (Sun, Tue, Wed, Fri, Sat).     Physical Exam     Vitals:    11/01/24 0854 11/01/24 0911   BP: 122/84 120/64   BP Location: Left upper arm Left upper arm   Patient Position: Sitting    Pulse: 72    SpO2: 97%    Weight: 58.5 kg (129 lb)    Height: 1.6 m (5' 3\")      BP Readings from Last 3 Encounters:   11/01/24 120/64   12/13/23 120/80   10/31/23 122/74     Wt Readings from Last 3 Encounters:   11/01/24 58.5 kg (129 lb)   12/13/23 59 kg (130 lb)   10/31/23 59.2 kg (130 lb 9.6 oz)    "   Physical Exam:  Constitutional: Well developed.  No apparent distress.   Eyes: No icterus  ENT:  Mucosa moist  Neck: Supple, no JVD or hepatojugular reflux.  No bruits.  Cardiac: Normal S1 and S2, regular rate and rhythm, no S3.  No rub.  No ectopy.    Lungs: Clear to auscultation bilaterally.  No wheezing.  Abdomen: Soft, nontender, positive normoactive bowel sounds.  No bruit.  Extremities: No clubbing or cyanosis.  No calf tenderness.  No edema.  Distal pulses are intact.  Skin: Warm and dry.  No jaundice.  Musculoskeletal: No joint swelling or erythema.  Neurologic: Awake, alert, oriented.  Moving all extremities.  Psychiatric: No agitation.    Labs/Imaging/Procedures   Labs  09/24/2024: Glucose 102, BUN 15, creatinine 0.65, GFR 93, potassium 4    Lab Results   Component Value Date    ALT 9 12/07/2023    AST 13 12/07/2023     Lab Results   Component Value Date    CHOL 134 12/07/2023    HDL 63 12/07/2023    TRIG 72 12/07/2023     Lab Results   Component Value Date    LDLCALC 56 12/07/2023     Imaging  Stress echo 12/13/2023: Stress echocardiogram does not meet criteria for myocardial ischemia. Stress ECG does not meet criteria for ischemia. Excellent exercise tolerance. Exercise duration 8 minutes 52 seconds achieving 10.1 METS. Gill treadmill score +8 which places this study into the low-risk category.    CT calcium score 10/5/2023: Coronary artery calcium Agatston score is 83.  Left main 0, LAD 83, LCx 0, RCA 0.  Ascending aorta measures 33 mm .Descending aorta measures 22 mm. No actionable incidental findings.     Exercise stress echo 8/16/2019: The left ventricle is normal in size.  There is normal wall thickness.  No regional wall motion abnormalities.  Left ventricular ejection fraction is 65 to 70%.  The right and left atrium are normal in size.  The mitral valve is normal in appearance.  There is trace mitral regurgitation.  The aortic valve is trileaflet.  There is no significant aortic stenosis or  insufficiency.  The right ventricle is normal in size and function.  Aortic root and ascending aorta are normal in size.  There is trace tricuspid regurgitation with an estimated PA systolic pressure 21 mmHg. Exercise tolerance was average. The EKG does not meet diagnostic criteria for ischemia. Exercise echocardiogram does not meet diagnostic criteria for ischemia     Holter monitor 1/28/2016: Good technical quality. Sinus rhythm with average HR 72 bpm. Min HR 58 bpm. Max  bpm. No significant pauses or AV block noted. Normal VT, QRS, and QT intervals at varying heart rates. Max R-R interval is 1.05 seconds. Isolated APDs noted. No complex atrial arrhythmia. Isolated VPDs noted. No complex ventricular arrhythmia. No diary submitted.     Exercise stress echo 1/22/2016: This is a normal exercise stress echocardiogram. Exercise electrocardiogram did have significant artifact which was somewhat technically limiting, but there is no evidence of ischemia or exercise-induced arrhythmia. Normal LV function at rest and hyperdynamic function with exercise.       EKG  Assessment/Plan     1. Coronary artery calcification seen on CAT scan    2. Shortness of breath    3. Sensation of chest tightness    4. Palpitations    5. Elevated LDL cholesterol level       Patricia is aware of the fact that she has an elevated CT calcium score and her LDL goal is less than 70 mg/dL.  She is continuing rosuvastatin 5 mg 5 times weekly.  Her EKG is unchanged.  She has no new anginal symptoms.  But states that she is disappointed that she has not continued losing weight and with the recently mild elevation in her glucose, she is going to be practicing more dietary discretion and increasing her physical activity.  Palpitations seem to be under good control with no sustained episodes.  If she has any change in her symptoms, she will contact the office-otherwise I will see her back in 1 year.    Return in about 1 year (around 11/1/2025) for  follow-up, earlier if any change in symptoms.  Available medical records reviewed and updated including laboratory data, imaging studies, previous outpatient and inpatient records.  Counseling/education performed. Care everywhere utilized where appropriate.    Thank you for allowing me to participate in the care of this patient.  I hope this information is helpful.      Shabana Willingham MD, [facility], FASE  11/1/2024  5:23 PM  This document was generated utilizing voice recognition technology. Please excuse any typographical errors which may be present.      By signing my name below, I, Minerva Serna, attest that this documentation has been prepared under the direction and in the presence of MD Minerva Ballard  11/1/2024 5:23 PM

## 2024-11-01 ENCOUNTER — OFFICE VISIT (OUTPATIENT)
Dept: CARDIOLOGY | Facility: CLINIC | Age: 74
End: 2024-11-01
Payer: COMMERCIAL

## 2024-11-01 VITALS
DIASTOLIC BLOOD PRESSURE: 64 MMHG | SYSTOLIC BLOOD PRESSURE: 120 MMHG | OXYGEN SATURATION: 97 % | HEIGHT: 63 IN | WEIGHT: 129 LBS | HEART RATE: 72 BPM | BODY MASS INDEX: 22.86 KG/M2

## 2024-11-01 DIAGNOSIS — R06.02 SHORTNESS OF BREATH: ICD-10-CM

## 2024-11-01 DIAGNOSIS — R07.89 SENSATION OF CHEST TIGHTNESS: ICD-10-CM

## 2024-11-01 DIAGNOSIS — I25.10 CORONARY ARTERY CALCIFICATION SEEN ON CAT SCAN: Primary | ICD-10-CM

## 2024-11-01 DIAGNOSIS — R00.2 PALPITATIONS: ICD-10-CM

## 2024-11-01 DIAGNOSIS — E78.00 ELEVATED LDL CHOLESTEROL LEVEL: ICD-10-CM

## 2024-11-01 LAB
ATRIAL RATE: 72
P AXIS: 34
PR INTERVAL: 154
QRS DURATION: 82
QT INTERVAL: 398
QTC CALCULATION(BAZETT): 435
R AXIS: 79
T WAVE AXIS: 62
VENTRICULAR RATE: 72

## 2024-11-01 PROCEDURE — 99214 OFFICE O/P EST MOD 30 MIN: CPT | Performed by: INTERNAL MEDICINE

## 2024-11-01 PROCEDURE — 93000 ELECTROCARDIOGRAM COMPLETE: CPT | Performed by: INTERNAL MEDICINE

## 2024-11-01 RX ORDER — ROSUVASTATIN CALCIUM 5 MG/1
5 TABLET, COATED ORAL
COMMUNITY
Start: 2024-11-01

## 2024-11-01 RX ORDER — CHOLECALCIFEROL (VITAMIN D3) 25 MCG
25 TABLET ORAL 2 TIMES DAILY
COMMUNITY

## 2024-11-01 RX ORDER — ASPIRIN 81 MG/1
81 TABLET ORAL DAILY
COMMUNITY

## 2024-11-01 RX ORDER — DOCUSATE SODIUM 100 MG/1
200 CAPSULE, LIQUID FILLED ORAL DAILY
COMMUNITY

## 2024-11-01 NOTE — LETTER
November 1, 2024     Alex Vargas DO  [address]  [facility] [address]    Patient: Trudi Patel  YOB: 1950  Date of Visit: 11/1/2024      Dear Dr. Vargas:    Thank you for referring Trudi Patel to me for evaluation. Below are my notes for this consultation.    If you have questions, please do not hesitate to call me. I look forward to following your patient along with you.         Sincerely,        Shabana Willingham MD        CC: MD Yaya Roberts Erin, MD  11/1/2024  5:27 PM  Sign when Signing Visit       Cardiology Office Visit     Patient ID: Trudi Patel 74 y.o. female 1950  PCP: Alex Vargas DO   History Present Illness     Trudi Patel returns to the office today accompanied by her .  As you recall she is a 74-year-old white female with a history of reflux, irritable bowel, seizure disorder, osteoporosis and tremors.  More recently she has been diagnosed with hyperlipidemia and a CT calcium score in October 2023 was 83, all of which was located in the left anterior descending.    She has had issues with lipid therapy, but is now on rosuvastatin 5 times a week and tolerating this.  Recently she was in [place] and did quite a bit of walking with no anginal symptoms.  Her last LDL approximately a year ago was 56 mg/dL.    Allergies/Medications   Allergies:Patient has no known allergies.    Medications:  •  aspirin, Take 81 mg by mouth daily.  •  cholecalciferol (vitamin D3), Take 25 mcg by mouth 2 (two) times a day.  •  denosumab, Inject 60 mg under the skin.  •  docusate sodium, Take 200 mg by mouth daily.  •  MAGNESIUM ORAL, Take 2 tablets by mouth nightly.  •  montelukast, Take 1 tablet (10 mg total) by mouth daily.  •  ondansetron ODT, DISSOLVE 1 TABLET ON TONGUE EVERY 8 HOURS AS NEEDED FOR NAUSEA AND VOMIT  •  PHENobarbitaL, 1 tablet (60 mg total) every evening.  •  rabeprazole sodium (ACIPHEX ORAL), Take 20 mg by mouth 2 (two)  "times a day.  •  rosuvastatin, Take 1 tablet (5 mg total) by mouth 5 (five) times a week (Sun, Tue, Wed, Fri, Sat).     Physical Exam     Vitals:    11/01/24 0854 11/01/24 0911   BP: 122/84 120/64   BP Location: Left upper arm Left upper arm   Patient Position: Sitting    Pulse: 72    SpO2: 97%    Weight: 58.5 kg (129 lb)    Height: 1.6 m (5' 3\")      BP Readings from Last 3 Encounters:   11/01/24 120/64   12/13/23 120/80   10/31/23 122/74     Wt Readings from Last 3 Encounters:   11/01/24 58.5 kg (129 lb)   12/13/23 59 kg (130 lb)   10/31/23 59.2 kg (130 lb 9.6 oz)      Physical Exam:  Constitutional: Well developed.  No apparent distress.   Eyes: No icterus  ENT:  Mucosa moist  Neck: Supple, no JVD or hepatojugular reflux.  No bruits.  Cardiac: Normal S1 and S2, regular rate and rhythm, no S3.  No rub.  No ectopy.    Lungs: Clear to auscultation bilaterally.  No wheezing.  Abdomen: Soft, nontender, positive normoactive bowel sounds.  No bruit.  Extremities: No clubbing or cyanosis.  No calf tenderness.  No edema.  Distal pulses are intact.  Skin: Warm and dry.  No jaundice.  Musculoskeletal: No joint swelling or erythema.  Neurologic: Awake, alert, oriented.  Moving all extremities.  Psychiatric: No agitation.    Labs/Imaging/Procedures   Labs  09/24/2024: Glucose 102, BUN 15, creatinine 0.65, GFR 93, potassium 4    Lab Results   Component Value Date    ALT 9 12/07/2023    AST 13 12/07/2023     Lab Results   Component Value Date    CHOL 134 12/07/2023    HDL 63 12/07/2023    TRIG 72 12/07/2023     Lab Results   Component Value Date    LDLCALC 56 12/07/2023     Imaging  Stress echo 12/13/2023: Stress echocardiogram does not meet criteria for myocardial ischemia. Stress ECG does not meet criteria for ischemia. Excellent exercise tolerance. Exercise duration 8 minutes 52 seconds achieving 10.1 METS. Gill treadmill score +8 which places this study into the low-risk category.    CT calcium score 10/5/2023: Coronary " artery calcium Agatston score is 83.  Left main 0, LAD 83, LCx 0, RCA 0.  Ascending aorta measures 33 mm .Descending aorta measures 22 mm. No actionable incidental findings.     Exercise stress echo 8/16/2019: The left ventricle is normal in size.  There is normal wall thickness.  No regional wall motion abnormalities.  Left ventricular ejection fraction is 65 to 70%.  The right and left atrium are normal in size.  The mitral valve is normal in appearance.  There is trace mitral regurgitation.  The aortic valve is trileaflet.  There is no significant aortic stenosis or insufficiency.  The right ventricle is normal in size and function.  Aortic root and ascending aorta are normal in size.  There is trace tricuspid regurgitation with an estimated PA systolic pressure 21 mmHg. Exercise tolerance was average. The EKG does not meet diagnostic criteria for ischemia. Exercise echocardiogram does not meet diagnostic criteria for ischemia     Holter monitor 1/28/2016: Good technical quality. Sinus rhythm with average HR 72 bpm. Min HR 58 bpm. Max  bpm. No significant pauses or AV block noted. Normal MD, QRS, and QT intervals at varying heart rates. Max R-R interval is 1.05 seconds. Isolated APDs noted. No complex atrial arrhythmia. Isolated VPDs noted. No complex ventricular arrhythmia. No diary submitted.     Exercise stress echo 1/22/2016: This is a normal exercise stress echocardiogram. Exercise electrocardiogram did have significant artifact which was somewhat technically limiting, but there is no evidence of ischemia or exercise-induced arrhythmia. Normal LV function at rest and hyperdynamic function with exercise.       EKG  Assessment/Plan     1. Coronary artery calcification seen on CAT scan    2. Shortness of breath    3. Sensation of chest tightness    4. Palpitations    5. Elevated LDL cholesterol level       Patricia is aware of the fact that she has an elevated CT calcium score and her LDL goal is less than 70  mg/dL.  She is continuing rosuvastatin 5 mg 5 times weekly.  Her EKG is unchanged.  She has no new anginal symptoms.  But states that she is disappointed that she has not continued losing weight and with the recently mild elevation in her glucose, she is going to be practicing more dietary discretion and increasing her physical activity.  Palpitations seem to be under good control with no sustained episodes.  If she has any change in her symptoms, she will contact the office-otherwise I will see her back in 1 year.    Return in about 1 year (around 11/1/2025) for follow-up, earlier if any change in symptoms.  Available medical records reviewed and updated including laboratory data, imaging studies, previous outpatient and inpatient records.  Counseling/education performed. Care everywhere utilized where appropriate.    Thank you for allowing me to participate in the care of this patient.  I hope this information is helpful.      Shabana Willingham MD, [facility], FASE  11/1/2024  5:23 PM  This document was generated utilizing voice recognition technology. Please excuse any typographical errors which may be present.      By signing my name below, I, Minerva Serna, attest that this documentation has been prepared under the direction and in the presence of MD Minerva Ballard  11/1/2024 5:23 PM

## 2024-12-02 ENCOUNTER — PREP FOR PROCEDURE (OUTPATIENT)
Age: 74
End: 2024-12-02

## 2024-12-02 ENCOUNTER — TELEPHONE (OUTPATIENT)
Age: 74
End: 2024-12-02

## 2024-12-02 DIAGNOSIS — Z80.0 FAMILY HISTORY OF COLON CANCER: Primary | ICD-10-CM

## 2024-12-02 NOTE — TELEPHONE ENCOUNTER
Scheduled date of colonoscopy (as of today): 1/22/25    Physician performing colonoscopy: Dr. Campoverde    Location of colonoscopy: [facility]     (1) Other Medications/None

## 2024-12-02 NOTE — LETTER
Saulo Méndez,    Osvaldo are your prep instructions for your upcoming Colonoscopy scheduled on 1/22/25 with Dr. Campoverde.  The GI lab will be calling the day before between 2pm and 6pm with your arrival time for your procedure. If you have any questions, please feel free to contact our office at [phone].    Address to our location:  [facility], [address] [place] [address], [id number]     Thank you for choosing [facility]!                                                                       COLONOSCOPY  MIRALAX/Dulcolax Bowel Preparation Instructions    The OR/GI Lab will contact you the evening prior to your procedure with your exact arrival time.    Our practice requires a 1 week notice for any cancellations or rescheduling. We kindly ask that you immediately notify us of any changes including any new medications that are prescribed. Thank you for your cooperation.     WEEK BEFORE YOUR PROCEDURE:  Stop taking Iron tablets.  5 days prior, AVOID vegetables and fruits with skins or seeds, nuts, corn, popcorn and whole grain breads.   Purchase: One (1) 238-gram container of Miralax (polyethylene glycol 3350), four (4) 5 mg Dulcolax (bisacodyl) tablets, and one (1) 64-ounce bottle of Gatorade (sports drink) - no red, orange, or purple. These may be purchased at any pharmacy without a prescription. Generic products are permissible.   Arrange responsible transportation for day of the procedure.     DAY BEFORE THE PROCEDURE:   CLEAR liquids only for entire day prior. Nothing red, orange or purple.    You MAY have:                                                               Soda  Water  Broth Gatorade  Jello  Popsicles Coffee/tea without milk/creamer     YOU MAY NOT HAVE:  Solid foods   Milk and milk products    Juice with pulp    BOWEL PREPARATION:  Includes: One (1) 238-gram container of Miralax (polyethylene glycol 3350), four (4) 5 mg  Dulcolax (bisacodyl) tablets, and one (1) 64-ounce bottle of Gatorade (sports drink).  Preparation may be refrigerated.  Entire bowel prep should be completed.     Afternoon before the procedure (2:00 pm - 5:00 pm):    Take two (2) 5 mg Dulcolax laxative tablets.     Evening before the procedure (6:00 pm):  Mix entire container of Miralax with one (1) 64-ounce bottle of Gatorade and shake until all medication is dissolved.   Begin drinking solution. Drink an eight (8) ounce cup every 10-15 minutes until you have consumed half (32 ounces) of the solution.  Refrigerate remaining solution.    Night before the procedure (8:00 pm):  Take two (2) 5 mg Dulcolax laxative tablets.     Beginning 5 hours before your procedure:  Drink the remaining amount of prepared solution (32 ounces).  Drink an eight (8) ounce cup every 10-15 minutes until you have consumed the remaining solution.     Bowel prep should be completed 4 hours prior to procedure time.    NOTHING TO EAT OR DRINK AFTER MIDNIGHT- EXCEPT FOR YOUR PREP    DAY OF THE PROCEDURE:  You may brush your teeth.  Leave all jewelry at home.  Please arrive for your procedure as indicated by the OR / GI Lab / Endoscopy Unit. The hospital will contact you the day before with your exact arrival time.   Make sure you have arranged ahead of time for a responsible adult (18 or older) to accompany and drive you home after the procedure.  Please discuss any transportation concerns with our staff prior to your procedure.    The effects of the anesthesia can persist for 24 hours.  After receiving the sedation, you must exercise caution before engaging in any activity that could harm yourself and others (such as driving a car).  Do not make any important decisions or do not drink any alcoholic beverages during this time period.  After your procedure, you may have anything you'd like to eat or drink.  You will probably want to start with something light.  Please include plenty of fluids.   Avoid items that cause gas such as sodas and salads.    SPECIAL INSTRUCTIONS:    For patients currently taking blood thinners and/or antiplatelet therapy our office will contact the prescribing provider.  Our office will contact you with any required changes to your medication regimen.     Blood thinner (i.e. - Coumadin, Pradaxa, Lovenox, Xarelto, Eliquis)  ?  Continue (Do Not Stop)  ? Stop______________for_____________days prior to the procedure.    Antiplatelet (i.e. - Plavix, Aggrenox, Effient, Brilinta)  ?  Continue (Do Not Stop)  ? Stop______________for_____________days prior to the procedure.       Diabetes:   If you are Diabetic, please see separate Diabetic Instruction Sheet.          Prescribed medications:  Do not stop your aspirin, or any of your other medications (unless instructed otherwise).    Take the rest of your prescribed medications with small sips of water at least 2 hours prior to your procedure.      For any questions or concerns related to your bowel preparation or pre-procedure instructions, please contact our office at [phone].  Thank you for choosing [facility]!                     Medicine Instructions for Adults with Diabetes who Need a Bowel Prep       Follow these instructions when a BOWEL PREP is required for your procedure or surgery!    NOTE:   GLP-1 Agonists taken weekly: do not take in the 7 days before your procedure   SGLT-2 Inhibitors: do not take in the 4 days before your procedure     On the Day Before Surgery/Procedure  If you are having a procedure (e.g. Colonoscopy) or surgery that requires a bowel prep and you may have at least a clear liquid diet, follow the directions below based on the type of medicine you take for your diabetes.     Type of Medicine You Take Examples What to do   Pre-Mixed Insulin - Intermediate Acting Humalog® 75/25, Humulin® 70/30, Novolog® 70/30, Regular Insulin Take ½ your regular dose the evening before your procedure    Rapid/Fast Acting Insulin Humalog® U200, NovoLog®, Apidra®, Fiasp® Take ½ your regular dose the evening before your procedure.   Long-Acting Insulin Lantus®, Levemir®, Tresiba®, Toujeo®, Basaglar® Take your FULL regular dose the day before procedure   Oral Sulfonylurea Glipizide/Glimepiride/Glucotrol® Take ½ your regular dose the evening before your procedure   Other Oral Diabetes Medicines Metformin®, Glucophage®, Glucophage XR®, Riomet®, Glumetza®), Actose®, Avandia®, Glyset®, Prandin® Take your regular dose with dinner in the evening before your procedure   GLP-1 Agonists AdlyxinÒ, ByettaÒ, BydureonÒ, OzempicÒ, SoliquaÒ, TanzeumÒ, TrulicityÒ, VictozaÒ, Saxenda®, Rybelsus® If taken daily, take as normal    If taken weekly, do not take this medicine for 7 days before your procedure including the day of the procedure (resume taking after the procedure)   SGLT-2 Inhibitors Jardiance®, Invokana®, Farxiga®,   Steglatro®, Brenzavvy®, Qtern®, Segluromet®, Glyxambi®, Synjardy®, Synjardy XR®, Invokamet®, Invokamet XR®, Trijary XR®, Xigduo XR®, Steglujan® Do not take for 4 days before your procedure including the day of the procedure (resume taking after the procedure)                More information continued on back                    Medicine Instructions for Adults with Diabetes who Need a Bowel Prep  Page 2      On the Day of Surgery/Procedure  Follow the directions below based on the type of medicine you take for your diabetes.     Type of Medicine You Take Examples What to do   Long-Acting Insulin Lantus®, Levemir®, Tresiba®, Toujeo®, Basaglar®, Semglee®   If you usually take your Long-Acting Insulin in the morning, take the full dose as scheduled.   GLP-1 Agonists AdlyxinÒ, ByettaÒ, BydureonÒ, OzempicÒ, SoliquaÒ, TanzeumÒ, TrulicityÒ, VictozaÒ, Saxenda®, Rybelsus® Do NOT take this medicine on the day of your procedure (resume taking after the procedure)       On the Day of Surgery/Procedure (continued)  Except for  the morning Long-Acting Insulin, DO NOT take ANY diabetic medicine on the day of your procedure unless you were instructed by the doctor who manages your diabetes medicines.    Continue to check your blood sugars.  If you have an insulin pump, ask your endocrinologist for instructions at least 3 days before your procedure. NOTE: If you are not able to ask your endocrinologist in advance, on the day of the procedure set your insulin pump to your basal rate only. Bring your insulin pump supplies to the hospital.     If you have any questions about taking your diabetes medicines prior to your procedure, please contact the doctor who manages your diabetes medicines.

## 2024-12-11 ENCOUNTER — OFFICE VISIT (OUTPATIENT)
Dept: OBGYN CLINIC | Facility: CLINIC | Age: 74
End: 2024-12-11
Payer: COMMERCIAL

## 2024-12-11 VITALS
SYSTOLIC BLOOD PRESSURE: 124 MMHG | HEIGHT: 63 IN | BODY MASS INDEX: 22.68 KG/M2 | WEIGHT: 128 LBS | DIASTOLIC BLOOD PRESSURE: 72 MMHG

## 2024-12-11 DIAGNOSIS — N95.2 ATROPHIC VAGINITIS: Primary | ICD-10-CM

## 2024-12-11 PROCEDURE — 99203 OFFICE O/P NEW LOW 30 MIN: CPT | Performed by: OBSTETRICS & GYNECOLOGY

## 2024-12-11 RX ORDER — ESTRADIOL 0.1 MG/G
1 CREAM VAGINAL 2 TIMES WEEKLY
Qty: 42 G | Refills: 3 | Status: SHIPPED | OUTPATIENT
Start: 2024-12-12

## 2024-12-11 NOTE — PROGRESS NOTES
[facility] [address], [address], [place] [address]  Assessment & Plan  Atrophic vaginitis  Recommend estrace vaginal cream to help with atrophy. Also recommend looser fitting underwear. Can use OTC cream to help protect skin. Pt given reassurance that she has no significant prolapse that would need to be addressed at this time.  Orders:    estradiol (ESTRACE) 0.1 mg/g vaginal cream; Insert 1 g into the vagina 2 (two) times a week    Subjective:   Christina Brasher is a 74 y.o. No obstetric history on file. .  CC:   Chief Complaint   Patient presents with    Personal Problem     Pt states very dry that it hurts to walk, leaking, taking colace that helps       HPI: Pt here to discuss vulvar irritation. She reports that it has gotten significantly worse over the past few months with increased physical activity, honorio walking. She is wearing leak protection and per her description she sounds as though it is too tight.    ROS: Negative except as noted in HPI    No LMP recorded. Patient is postmenopausal.       She  reports that she is not currently sexually active and has had partner(s) who are male. She reports using the following methods of birth control/protection: Abstinence, Coitus interruptus, Condom Male, Rhythm, Spermicide, and Sponge.       The following portions of the patient's history were reviewed and updated as appropriate:   Past Medical History:   Diagnosis Date    ADD (attention deficit disorder)     Colon polyp 1988    GERD (gastroesophageal reflux disease) 2004    Hearing loss     Irritable bowel syndrome 1959    Osteoporosis     Postnasal drip     Seizure disorder (HCC)      Past Surgical History:   Procedure Laterality Date    APPENDECTOMY  1959    CHOLECYSTECTOMY  1985    COLONOSCOPY  2020    SHOULDER SURGERY Left     TUBAL LIGATION  1988    UPPER GASTROINTESTINAL ENDOSCOPY  2020     Family History   Problem Relation Age of Onset    Depression Mother     Heart disease Mother      Hyperlipidemia Mother     Hypertension Mother     Irritable bowel syndrome Mother     Arthritis Father     Cancer Father     Colon polyps Father     Heart disease Father     Hyperlipidemia Father     Hypertension Father     Hyperlipidemia Sister     Hypertension Sister     Miscarriages / Stillbirths Sister     Hyperlipidemia Brother      Social History     Socioeconomic History    Marital status: /Civil Union     Spouse name: Not on file    Number of children: Not on file    Years of education: Not on file    Highest education level: Not on file   Occupational History    Not on file   Tobacco Use    Smoking status: Never     Passive exposure: Never    Smokeless tobacco: Never   Vaping Use    Vaping status: Never Used   Substance and Sexual Activity    Alcohol use: Yes     Alcohol/week: 1.0 standard drink of alcohol     Types: 1 Glasses of wine per week     Comment: social    Drug use: Never    Sexual activity: Not Currently     Partners: Male     Birth control/protection: Abstinence, Coitus interruptus, Condom Male, Rhythm, Spermicide, Sponge   Other Topics Concern    Not on file   Social History Narrative    Not on file     Social Drivers of Health     Financial Resource Strain: Not At Risk (4/29/2024)    [facility]    Financial Resource Strain     In the last 12 months did you skip medications to save money?: No     In the last 12 months, was there a time when you needed to see a doctor but could not because of cost?: No   Food Insecurity: Not At Risk (4/29/2024)    [facility]    Food Insecurity     In the last 12 months did you ever eat less than you felt you should because there wasn't enough money for food?: No   Transportation Needs: Not At Risk (4/29/2024)    [facility]  "   Transporation     In the last 12 months, have you ever had to go without healthcare because you didn't have a way to get there?: No   Physical Activity: Inactive (2/4/2023)    Received from [facility]    Exercise Vital Sign     Days of Exercise per Week: 0 days     Minutes of Exercise per Session: 0 min   Stress: No Stress Concern Present (4/29/2024)    Received from [facility]    [employer] - Occupational Stress Questionnaire     Feeling of Stress : Not at all   Social Connections: Not At Risk (4/29/2024)    Received from [facility]    Social Connections     Do you often feel lonely?: No   Intimate Partner Violence: Not on file   Housing Stability: Not At Risk (4/29/2024)    Received from [facility]    Housing Stability     Are you worried that in the next 2 months you may not have stable housing?: No     Outpatient Medications Marked as Taking for the 12/11/24 encounter (Office Visit) with Terry Pagan MD   Medication    aspirin (ECOTRIN LOW STRENGTH) 81 mg EC tablet    chlorpheniramine (CHLOR-TRIMETON) 4 MG tablet    denosumab (PROLIA) 60 mg/mL    docusate (COLACE) 60 MG/15ML syrup    [START ON 12/12/2024] estradiol (ESTRACE) 0.1 mg/g vaginal cream    PEPPERMINT OIL PO    RABEprazole (ACIPHEX) 20 MG tablet     Allergies   Allergen Reactions    Pollen Extract Allergic Rhinitis and Other (See Comments)           Objective:  /72 (BP Location: Left arm, Patient Position: Sitting, Cuff Size: Standard)   Ht 5' 3\" (1.6 m)   Wt 58.1 kg (128 lb)   BMI 22.67 kg/m²        General Appearance: alert and oriented, in no acute distress.   Abdomen: Soft, non-tender, non-distended, no masses, no rebound or guarding.  Pelvic:       " External genitalia: Atrophic appearance, no abnormal pigmentation, no lesions or masses. Normal Bartholin's and [place]'s. Posterior fourchette appears raw      Urinary system: Urethral meatus normal, bladder non-tender.      Vaginal: normal mucosa without prolapse or lesions. Normal-appearing physiologic discharge.      Cervix: Normal-appearing, well-epithelialized, no gross lesions or masses. No cervical motion tenderness.      Adnexa: No adnexal masses or tenderness noted.      Uterus: Normal-sized, regular contour, midline, mobile, no uterine tenderness.   Extremities: Normal range of motion.   Skin: normal, no rash or abnormalities  Neurologic: alert, oriented x3  Psychiatric: Appropriate affect, mood stable, cooperative with exam.        Terry Pagan MD  12/11/2024 5:25 PM

## 2024-12-11 NOTE — ASSESSMENT & PLAN NOTE
Recommend estrace vaginal cream to help with atrophy. Also recommend looser fitting underwear. Can use OTC cream to help protect skin. Pt given reassurance that she has no significant prolapse that would need to be addressed at this time.  Orders:    estradiol (ESTRACE) 0.1 mg/g vaginal cream; Insert 1 g into the vagina 2 (two) times a week

## 2025-01-02 DIAGNOSIS — E78.49 OTHER HYPERLIPIDEMIA: Primary | ICD-10-CM

## 2025-01-22 ENCOUNTER — OFFICE VISIT (OUTPATIENT)
Dept: GASTROENTEROLOGY | Facility: CLINIC | Age: 75
End: 2025-01-22
Payer: COMMERCIAL

## 2025-01-22 VITALS
DIASTOLIC BLOOD PRESSURE: 74 MMHG | SYSTOLIC BLOOD PRESSURE: 118 MMHG | HEIGHT: 63 IN | WEIGHT: 131.6 LBS | BODY MASS INDEX: 23.32 KG/M2

## 2025-01-22 DIAGNOSIS — Z12.11 SCREENING FOR COLON CANCER: ICD-10-CM

## 2025-01-22 DIAGNOSIS — K21.9 GASTROESOPHAGEAL REFLUX DISEASE WITHOUT ESOPHAGITIS: ICD-10-CM

## 2025-01-22 DIAGNOSIS — Z86.0100 HISTORY OF COLON POLYPS: Primary | ICD-10-CM

## 2025-01-22 DIAGNOSIS — R19.8 STRAINING DURING BOWEL MOVEMENTS: ICD-10-CM

## 2025-01-22 DIAGNOSIS — K64.8 PROLAPSED HEMORRHOIDS: ICD-10-CM

## 2025-01-22 PROCEDURE — 99214 OFFICE O/P EST MOD 30 MIN: CPT | Performed by: INTERNAL MEDICINE

## 2025-01-22 PROCEDURE — G2211 COMPLEX E/M VISIT ADD ON: HCPCS | Performed by: INTERNAL MEDICINE

## 2025-01-22 NOTE — ASSESSMENT & PLAN NOTE
Stable GERD.  Patient does have intermittent flare ups  -Continue on rabeprazole 20 mg twice a day

## 2025-01-22 NOTE — PROGRESS NOTES
Name: Christina rBasher      : 1950      MRN: [id number]  Encounter Provider: Kee Campoverde MD  Encounter Date: 2025   Encounter department: [facility]  :  Assessment & Plan  History of colon polyps  -Patient with previous history of colon polyps.  Father had colon cancer in his 60s.  Patient due for colonoscopy at this time.  -Schedule at  [facility].  -Patient has a tortuous redundant colon noted on previous exams abdomen outside facility. .  Will have Dr. Bonds or Adriana perform  Orders:    polyethylene glycol (GOLYTELY) 4000 mL solution; Take 4,000 mL by mouth once for 1 dose as directed  -In the schedule for colonoscopy  Gastroesophageal reflux disease without esophagitis  Stable GERD.  Patient does have intermittent flare ups  -Continue on rabeprazole 20 mg twice a day       Prolapsed hemorrhoids  -Patient with a history of anal tags and hemorrhoids.  Has seen colorectal surgery.  Conservative measures advised       Straining during bowel movements  Patient with straining with bowel movements.  Did see GYN and has no rectocele.  May benefit from pelvic floor therapy.  Consult for PT place  Orders:    Ambulatory Referral to Physical Therapy; Future    Screening for colon cancer  Up-to-date with screening.  Due for colonoscopy this spring.  Placed in schedule       Follow  up 1 year -     History of Present Illness   HPI  Christina Brasher is a 74 y.o. female who presents for routine follow-up.  Overall she is stable from a GI point of view.  She does have issues with chronic GERD.  If she deviates from taking twice a day rabeprazole or she  will have trouble.  She did have upper endoscopy for a flareup of her GERD like symptoms in May 2024 and this was revealing for small hiatal hernia but otherwise essentially normal exam.  She had some minor gastritis H. pylori negative.  Her bowels regularly has to strain and push.  She was seen by GYN and no  "cystocele this was noted.  Patient does have a family history polyps.  Her last examination was years ago and was negative.  Her father had colon 60s.  Evidently patient has a somewhat to examine as reported by his examiners.  Patient does have some problems anal tags and prolapse.  She was referred to the surgery Dr. Kenny as conservative therapy.  Patient had concerns about provide she has to push sometimes and straining to have an effective bowel movement she does not have issues with incontinence.      Review of Systems  Medical History Reviewed by provider this encounter:  Tobacco  Allergies  Meds  Problems  Med Hx  Surg Hx  Fam Hx  Soc   Hx    .     Objective   /74   Ht 5' 3\" (1.6 m)   Wt 59.7 kg (131 lb 9.6 oz)   BMI 23.31 kg/m²      Physical Exam  ..General Appearance: NAD, cooperative, alert  Eyes: Anicteric, conjunctiva pink  ENT:  Normocephalic, atraumatic, normal mucosa.    Neck:    Supple, symmetrical, trachea midline  Resp:  Clear to auscultation bilaterally; no rales, rhonchi or wheezing; respirations unlabored   CV:  S1 S2, Regular rate and rhythm; no murmur, rub, or gallop.  GI:  Soft, non-tender, non-distended; normal bowel sounds; no masses, no organomegaly   Rectal: Deferred  Musculoskeletal: No cyanosis, clubbing or edema. Normal ROM.  Skin:     No jaundice, rashes, or lesions   Heme/Lymph: No palpable cervical lymphadenopathy  Psych: Normal affect, good eye contact  Neuro: No gross deficits, AAOx3       "

## 2025-01-22 NOTE — PATIENT INSTRUCTIONS
Name: Christina Brasher      : 1950      MRN: [id number]  Encounter Provider: Kee Campoverde MD  Encounter Date: 2025   Encounter department: [facility]  :  Assessment & Plan  History of colon polyps  -Patient with previous history of colon polyps.  Father had colon cancer in his 60s.  Patient due for colonoscopy at this time.  -Schedule at  [facility].  -Patient has a tortuous redundant colon noted on previous exams abdomen outside facility. .  Will have Dr. Bonds or Adriana perform  Orders:    polyethylene glycol (GOLYTELY) 4000 mL solution; Take 4,000 mL by mouth once for 1 dose as directed  -In the schedule for colonoscopy  Gastroesophageal reflux disease without esophagitis  Stable GERD.  Patient does have intermittent flare ups  -Continue on rabeprazole 20 mg twice a day       Prolapsed hemorrhoids  -Patient with a history of anal tags and hemorrhoids.  Has seen colorectal surgery.  Conservative measures advised       Straining during bowel movements  Patient with straining with bowel movements.  Did see GYN and has no rectocele.  May benefit from pelvic floor therapy.  Consult for PT place  Orders:    Ambulatory Referral to Physical Therapy; Future    Screening for colon cancer  Up-to-date with screening.  Due for colonoscopy this spring.  Placed in schedule       Follow  up 1 year -

## 2025-01-23 PROBLEM — R19.8 STRAINING DURING BOWEL MOVEMENTS: Status: ACTIVE | Noted: 2025-01-23

## 2025-01-23 PROBLEM — K64.8 PROLAPSED HEMORRHOIDS: Status: ACTIVE | Noted: 2025-01-23

## 2025-01-23 NOTE — ASSESSMENT & PLAN NOTE
-Patient with a history of anal tags and hemorrhoids.  Has seen colorectal surgery.  Conservative measures advised

## 2025-01-23 NOTE — ASSESSMENT & PLAN NOTE
Patient with straining with bowel movements.  Did see GYN and has no rectocele.  May benefit from pelvic floor therapy.  Consult for PT place  Orders:    Ambulatory Referral to Physical Therapy; Future

## 2025-03-13 ENCOUNTER — TELEPHONE (OUTPATIENT)
Dept: CARDIOLOGY | Facility: CLINIC | Age: 75
End: 2025-03-13
Payer: COMMERCIAL

## 2025-03-13 DIAGNOSIS — M79.89 LEG SWELLING: ICD-10-CM

## 2025-03-13 DIAGNOSIS — M79.605 PAIN IN BOTH LOWER EXTREMITIES: Primary | ICD-10-CM

## 2025-03-13 DIAGNOSIS — M79.604 PAIN IN BOTH LOWER EXTREMITIES: Primary | ICD-10-CM

## 2025-03-13 NOTE — TELEPHONE ENCOUNTER
Patient requesting script for Venous US of LE's due to pain/swelling.  She is aware if discomfort in LE increases, to have US done prior to getting on plane on Sunday.  Would need to r/o for DVT.

## 2025-03-17 ENCOUNTER — HOSPITAL ENCOUNTER (OUTPATIENT)
Dept: RADIOLOGY | Age: 75
Discharge: HOME | End: 2025-03-17
Attending: NURSE PRACTITIONER
Payer: COMMERCIAL

## 2025-03-17 ENCOUNTER — RESULTS FOLLOW-UP (OUTPATIENT)
Dept: CARDIOLOGY | Facility: CLINIC | Age: 75
End: 2025-03-17
Payer: COMMERCIAL

## 2025-03-17 DIAGNOSIS — M79.89 LEG SWELLING: ICD-10-CM

## 2025-03-17 DIAGNOSIS — M79.605 PAIN IN BOTH LOWER EXTREMITIES: ICD-10-CM

## 2025-03-17 DIAGNOSIS — M79.604 PAIN IN BOTH LOWER EXTREMITIES: ICD-10-CM

## 2025-03-17 PROCEDURE — 93970 EXTREMITY STUDY: CPT

## 2025-03-17 NOTE — TELEPHONE ENCOUNTER
Venous ultrasound of lower extremities was negative for DVT bilaterally.    I discussed with patient.  She reports they just got back from [place] and had walked about 7500 steps per day.  She continues with the joint pain and swelling and will follow-up with her PMD regarding this.

## 2025-03-18 NOTE — TELEPHONE ENCOUNTER
I typed letter in support of venous US of legs ordered to r/o DVT to [facility].    Faxed letter

## 2025-03-18 NOTE — TELEPHONE ENCOUNTER
Saundra from [facility] called regarding precert for US LEG VENOUS BILATERAL     Saundra needs more clinical information for review    Saundra can be reached at [phone]  Fax: [phone]  Ref: [id number]    Time sensitive : needs information before 03/19 at 3pm

## 2025-04-03 LAB
ALBUMIN SERPL-MCNC: 4.4 G/DL (ref 3.6–5.1)
ALBUMIN/GLOB SERPL: 1.9 (CALC) (ref 1–2.5)
ALP SERPL-CCNC: 74 U/L (ref 37–153)
ALT SERPL-CCNC: 9 U/L (ref 6–29)
AST SERPL-CCNC: 14 U/L (ref 10–35)
BILIRUB DIRECT SERPL-MCNC: 0.1 MG/DL
BILIRUB INDIRECT SERPL-MCNC: 0.3 MG/DL (CALC) (ref 0.2–1.2)
BILIRUB SERPL-MCNC: 0.4 MG/DL (ref 0.2–1.2)
CHOLEST SERPL-MCNC: 190 MG/DL
CHOLEST/HDLC SERPL: 2.8 (CALC)
GLOBULIN SER CALC-MCNC: 2.3 G/DL (CALC) (ref 1.9–3.7)
HDLC SERPL-MCNC: 69 MG/DL
LDLC SERPL CALC-MCNC: 103 MG/DL (CALC)
NONHDLC SERPL-MCNC: 121 MG/DL (CALC)
PROT SERPL-MCNC: 6.7 G/DL (ref 6.1–8.1)
TRIGL SERPL-MCNC: 85 MG/DL

## 2025-04-07 ENCOUNTER — TELEPHONE (OUTPATIENT)
Dept: CARDIOLOGY | Facility: CLINIC | Age: 75
End: 2025-04-07
Payer: COMMERCIAL

## 2025-04-07 ENCOUNTER — TELEPHONE (OUTPATIENT)
Age: 75
End: 2025-04-07

## 2025-04-07 DIAGNOSIS — I25.10 CORONARY ARTERY CALCIFICATION SEEN ON CAT SCAN: Primary | ICD-10-CM

## 2025-04-07 DIAGNOSIS — E78.2 MIXED HYPERLIPIDEMIA: ICD-10-CM

## 2025-04-07 RX ORDER — EPINEPHRINE 1 MG/ML
0.5 INJECTION, SOLUTION INTRAMUSCULAR; SUBCUTANEOUS ONCE AS NEEDED
OUTPATIENT
Start: 2025-04-21

## 2025-04-07 RX ORDER — FAMOTIDINE 10 MG/ML
20 INJECTION, SOLUTION INTRAVENOUS ONCE AS NEEDED
OUTPATIENT
Start: 2025-04-21

## 2025-04-07 NOTE — TELEPHONE ENCOUNTER
Pt. Due to have Prolia injection on 4/15 and has colonoscopy scheduled on 4/22/25 , please advise if this injection will affect colonoscopy, thank you

## 2025-04-08 NOTE — TELEPHONE ENCOUNTER
Pt called and asked about her Prolia and will it interfere with her colon. Please advise asap as she is going on vacation today.

## 2025-04-18 ENCOUNTER — TELEPHONE (OUTPATIENT)
Age: 75
End: 2025-04-18

## 2025-04-18 NOTE — TELEPHONE ENCOUNTER
Patient calling questioning if she can take her Magnesium gummies the night prior to her colonoscopy / Spoke with nurse Verna ALEJANDRE Told yes as long as they are not red orange or purple / Patient verbalized understanding

## 2025-04-21 ENCOUNTER — TELEPHONE (OUTPATIENT)
Age: 75
End: 2025-04-21

## 2025-04-21 NOTE — TELEPHONE ENCOUNTER
Patient calling regarding colonoscopy prep.  Patient has completed 2 day prep.  Patient states she feels as if there is stool near rectum and she needs to bare down.  Concerned there was still stool so she placed rectal thermometer in rectum and it came out clean.  States is still drinking miralax and is clear yellow but worried still stool stuck.  Wondering if should go to Urgent Care for Xray.  Please advise.

## 2025-04-22 ENCOUNTER — ANESTHESIA (OUTPATIENT)
Dept: GASTROENTEROLOGY | Facility: HOSPITAL | Age: 75
End: 2025-04-22
Payer: COMMERCIAL

## 2025-04-22 ENCOUNTER — HOSPITAL ENCOUNTER (OUTPATIENT)
Dept: GASTROENTEROLOGY | Facility: HOSPITAL | Age: 75
Setting detail: OUTPATIENT SURGERY
Discharge: HOME | End: 2025-04-22
Attending: INTERNAL MEDICINE
Payer: COMMERCIAL

## 2025-04-22 ENCOUNTER — ANESTHESIA EVENT (OUTPATIENT)
Dept: GASTROENTEROLOGY | Facility: HOSPITAL | Age: 75
End: 2025-04-22
Payer: COMMERCIAL

## 2025-04-22 VITALS
BODY MASS INDEX: 22.15 KG/M2 | DIASTOLIC BLOOD PRESSURE: 77 MMHG | RESPIRATION RATE: 20 BRPM | WEIGHT: 125 LBS | TEMPERATURE: 97.8 F | HEART RATE: 68 BPM | HEIGHT: 63 IN | OXYGEN SATURATION: 100 % | SYSTOLIC BLOOD PRESSURE: 120 MMHG

## 2025-04-22 DIAGNOSIS — Z80.0 FAMILY HISTORY OF COLON CANCER: ICD-10-CM

## 2025-04-22 PROBLEM — R56.9 SEIZURES (HCC): Status: ACTIVE | Noted: 2025-04-22

## 2025-04-22 PROCEDURE — 88305 TISSUE EXAM BY PATHOLOGIST: CPT | Performed by: PATHOLOGY

## 2025-04-22 PROCEDURE — 45385 COLONOSCOPY W/LESION REMOVAL: CPT | Performed by: INTERNAL MEDICINE

## 2025-04-22 RX ORDER — SODIUM CHLORIDE 9 MG/ML
75 INJECTION, SOLUTION INTRAVENOUS CONTINUOUS
Status: DISCONTINUED | OUTPATIENT
Start: 2025-04-22 | End: 2025-04-26 | Stop reason: HOSPADM

## 2025-04-22 RX ORDER — SODIUM CHLORIDE 9 MG/ML
INJECTION, SOLUTION INTRAVENOUS CONTINUOUS PRN
Status: DISCONTINUED | OUTPATIENT
Start: 2025-04-22 | End: 2025-04-22

## 2025-04-22 RX ORDER — PROPOFOL 10 MG/ML
INJECTION, EMULSION INTRAVENOUS AS NEEDED
Status: DISCONTINUED | OUTPATIENT
Start: 2025-04-22 | End: 2025-04-22

## 2025-04-22 RX ADMIN — PROPOFOL 50 MG: 10 INJECTION, EMULSION INTRAVENOUS at 12:46

## 2025-04-22 RX ADMIN — SODIUM CHLORIDE: 0.9 INJECTION, SOLUTION INTRAVENOUS at 12:28

## 2025-04-22 RX ADMIN — PROPOFOL 130 MG: 10 INJECTION, EMULSION INTRAVENOUS at 12:38

## 2025-04-22 RX ADMIN — PROPOFOL 50 MG: 10 INJECTION, EMULSION INTRAVENOUS at 12:42

## 2025-04-22 RX ADMIN — PROPOFOL 50 MG: 10 INJECTION, EMULSION INTRAVENOUS at 12:51

## 2025-04-22 NOTE — H&P
History and Physical - [facility]  Christina Brasher 74 y.o. female MRN: [id number]    HPI: Christina Brasher is a 74 y.o. female who presents for colonoscopy for colorectal cancer screening.  Family history of colon cancer.    REVIEW OF SYSTEMS: Per the HPI, and otherwise unremarkable.    Historical Information   Past Medical History:   Diagnosis Date    ADD (attention deficit disorder)     Colon polyp 1988    GERD (gastroesophageal reflux disease) 2004    Hearing loss     Irritable bowel syndrome 1959    Osteoporosis     Postnasal drip     Seizure disorder (HCC)      Past Surgical History:   Procedure Laterality Date    APPENDECTOMY  1959    CHOLECYSTECTOMY  1985    COLONOSCOPY  2020    SHOULDER SURGERY Left     TUBAL LIGATION  1988    UPPER GASTROINTESTINAL ENDOSCOPY  2020     Social History   Social History     Substance and Sexual Activity   Alcohol Use Yes    Alcohol/week: 1.0 standard drink of alcohol    Types: 1 Glasses of wine per week    Comment: social     Social History     Substance and Sexual Activity   Drug Use Never     Social History     Tobacco Use   Smoking Status Never    Passive exposure: Never   Smokeless Tobacco Never     Family History   Problem Relation Age of Onset    Depression Mother     Heart disease Mother     Hyperlipidemia Mother     Hypertension Mother     Irritable bowel syndrome Mother     Arthritis Father     Cancer Father     Colon polyps Father     Heart disease Father     Hyperlipidemia Father     Hypertension Father     Hyperlipidemia Sister     Hypertension Sister     Miscarriages / Stillbirths Sister     Hyperlipidemia Brother        Meds/Allergies       Current Outpatient Medications:     aspirin (ECOTRIN LOW STRENGTH) 81 mg EC tablet    chlorpheniramine (CHLOR-TRIMETON) 4 MG tablet    denosumab (PROLIA) 60 mg/mL    docusate (COLACE) 60 MG/15ML syrup    montelukast (SINGULAIR) 10 mg tablet    PHENobarbital 60 mg tablet    RABEprazole (ACIPHEX) 20 MG  "tablet    estradiol (ESTRACE) 0.1 mg/g vaginal cream    PEPPERMINT OIL PO    polyethylene glycol (GOLYTELY) 4000 mL solution    Allergies   Allergen Reactions    Pollen Extract Allergic Rhinitis and Other (See Comments)       Objective     /66   Pulse 68   Temp 97.8 °F (36.6 °C) (Temporal)   Resp 18   Ht 5' 3\" (1.6 m)   Wt 56.7 kg (125 lb)   SpO2 99%   BMI 22.14 kg/m²     PHYSICAL EXAM    Gen: NAD AAOx3  Head: Normocephalic, Atraumatic  CV: S1S2 RRR no m/r/g  CHEST: Clear b/l no c/r/w  ABD: soft, +BS NT/ND  EXT: no edema    ASSESSMENT/PLAN:  This is a 74 y.o. female here for colonoscopy, and she is stable and optimized for her procedure.        "

## 2025-04-22 NOTE — ANESTHESIA POSTPROCEDURE EVALUATION
Post-Op Assessment Note    CV Status:  Stable  Pain Score: 0    Pain management: adequate       Mental Status:  Sleepy   Hydration Status:  Euvolemic   PONV Controlled:  Controlled   Airway Patency:  Patent     Post Op Vitals Reviewed: Yes    No anethesia notable event occurred.    Staff: Anesthesiologist, CRNA           Last Filed PACU Vitals:  Vitals Value Taken Time   Temp     Pulse 71    BP 95/52    Resp 18    SpO2 98

## 2025-04-22 NOTE — ANESTHESIA PREPROCEDURE EVALUATION
Procedure:  COLONOSCOPY    Relevant Problems   CARDIO   (+) Prolapsed hemorrhoids      GI/HEPATIC   (+) Gastroesophageal reflux disease without esophagitis      Seizure disorder (HC  None for 20 years.  Physical Exam    Airway    Mallampati score: II  TM Distance: >3 FB  Neck ROM: full     Dental   No notable dental hx     Cardiovascular      Pulmonary      Other Findings  post-pubertal.      Anesthesia Plan  ASA Score- 2     Anesthesia Type- IV sedation with anesthesia with ASA Monitors.         Additional Monitors:     Airway Plan:            Plan Factors-Exercise tolerance (METS): >4 METS.    Chart reviewed.    Patient summary reviewed.    Patient is not a current smoker.              Induction- intravenous.    Postoperative Plan-         Informed Consent- Anesthetic plan and risks discussed with patient.  I personally reviewed this patient with the CRNA. Discussed and agreed on the Anesthesia Plan with the CRNA..  Seizure disorder     NPO Status:  No vitals data found for the desired time range.

## 2025-04-24 ENCOUNTER — TELEPHONE (OUTPATIENT)
Dept: GASTROENTEROLOGY | Facility: AMBULARY SURGERY CENTER | Age: 75
End: 2025-04-24

## 2025-04-24 NOTE — TELEPHONE ENCOUNTER
Ab from [facility] called to follow up on the plan of care form that was scanned into the patients chart. She sees Dr Campoverde, please assist

## 2025-04-25 PROCEDURE — 88305 TISSUE EXAM BY PATHOLOGIST: CPT | Performed by: PATHOLOGY

## 2025-04-26 ENCOUNTER — RESULTS FOLLOW-UP (OUTPATIENT)
Dept: GASTROENTEROLOGY | Facility: CLINIC | Age: 75
End: 2025-04-26

## 2025-05-16 ENCOUNTER — TELEPHONE (OUTPATIENT)
Dept: GASTROENTEROLOGY | Facility: CLINIC | Age: 75
End: 2025-05-16

## 2025-05-29 ENCOUNTER — TELEPHONE (OUTPATIENT)
Dept: GASTROENTEROLOGY | Facility: CLINIC | Age: 75
End: 2025-05-29

## 2025-06-16 ENCOUNTER — TELEPHONE (OUTPATIENT)
Age: 75
End: 2025-06-16

## 2025-06-16 NOTE — TELEPHONE ENCOUNTER
[facility] called -- looking to have script for physical therapy faxed over - gave fax # [phone] , Ubaldo Mccullough

## 2025-07-03 DIAGNOSIS — K21.9 GASTROESOPHAGEAL REFLUX DISEASE WITHOUT ESOPHAGITIS: ICD-10-CM

## 2025-07-03 RX ORDER — RABEPRAZOLE SODIUM 20 MG/1
20 TABLET, DELAYED RELEASE ORAL 2 TIMES DAILY
Qty: 180 TABLET | Refills: 1 | Status: SHIPPED | OUTPATIENT
Start: 2025-07-03 | End: 2026-03-30

## 2025-07-03 RX ORDER — RABEPRAZOLE SODIUM 20 MG/1
20 TABLET, DELAYED RELEASE ORAL 2 TIMES DAILY
Qty: 180 TABLET | Refills: 1 | Status: SHIPPED | OUTPATIENT
Start: 2025-07-03 | End: 2025-07-03 | Stop reason: SDUPTHER

## 2025-07-03 NOTE — TELEPHONE ENCOUNTER
Pt calling In because she requested a refill of this be sent to her [facility] but it was sent to [facility], she is asking if we can cxl the [facility] and res[facility] to [facility] please.

## 2025-07-18 ENCOUNTER — PATIENT MESSAGE (OUTPATIENT)
Dept: OBGYN CLINIC | Facility: CLINIC | Age: 75
End: 2025-07-18

## 2025-07-21 ENCOUNTER — NURSE TRIAGE (OUTPATIENT)
Age: 75
End: 2025-07-21

## 2025-07-21 NOTE — TELEPHONE ENCOUNTER
Regarding: propnate  ----- Message from Flores BARBER sent at 7/21/2025  8:20 AM EDT -----  Pt sent a myc message that she feels she has a prolapse. She feels as though her uterus is coming out of her vagina. She said this happened almost overnight. Was seen in December and did not have a significant prolapse. Please advise.

## 2025-07-21 NOTE — TELEPHONE ENCOUNTER
"REASON FOR CONVERSATION: Vaginal Pain    SYMPTOMS: sense of pressure, feels something at vaginal opening    OTHER HEALTH INFORMATION: H/o pelvic floor therapy in past. Currently in [place] for vacation. Returns 8/2.     PROTOCOL DISPOSITION: See Within 2 Weeks in Office ( when returns from vacation after 8/2)    CARE ADVICE PROVIDED:  Continue with Genital hygiene.   Call back with worsening symptoms  Appt provided for 8/7 at 10am in [place] with DR. Pagan.    PRACTICE FOLLOW-UP: n/a      Reason for Disposition   Feels like something inside is falling out of vagina (e.g., pressure, heaviness, fullness)    Answer Assessment - Initial Assessment Questions  1. SYMPTOM: \"What's the main symptom you're concerned about?\" (e.g., pain, itching, dryness)      Pressure when having bowel movement,  feels something coming out from vagina  2. LOCATION: \"Where is the  pressure located?\" (e.g., inside/outside, left/right)      vagina  3. ONSET: \"When did the  pressure  start?\"      friday  4. PAIN: \"Is there any pain?\" If Yes, ask: \"How bad is it?\" (Scale: 1-10; mild, moderate, severe)      denies  5. ITCHING: \"Is there any itching?\" If Yes, ask: \"How bad is it?\" (Scale: 1-10; mild, moderate, severe)      Using estradiol cream- feels like chafing  6. CAUSE: \"What do you think is causing the discharge?\" \"Have you had the same problem before?\" \"What happened then?\"      Denies- having urine leakage  7. OTHER SYMPTOMS: \"Do you have any other symptoms?\" (e.g., fever, itching, vaginal bleeding, pain with urination, injury to genital area, vaginal foreign body)      Light browninsh discharge feels this is urine  8. PREGNANCY: \"Is there any chance you are pregnant?\" \"When was your last menstrual period?\"      Post menopausal    Protocols used: Vaginal Symptoms-Adult-[place]    "